# Patient Record
Sex: MALE | Race: WHITE | NOT HISPANIC OR LATINO
[De-identification: names, ages, dates, MRNs, and addresses within clinical notes are randomized per-mention and may not be internally consistent; named-entity substitution may affect disease eponyms.]

---

## 2021-02-09 PROBLEM — Z00.00 ENCOUNTER FOR PREVENTIVE HEALTH EXAMINATION: Status: ACTIVE | Noted: 2021-02-09

## 2021-02-10 ENCOUNTER — APPOINTMENT (OUTPATIENT)
Dept: PULMONOLOGY | Facility: CLINIC | Age: 61
End: 2021-02-10
Payer: COMMERCIAL

## 2021-02-10 ENCOUNTER — NON-APPOINTMENT (OUTPATIENT)
Age: 61
End: 2021-02-10

## 2021-02-10 DIAGNOSIS — J45.909 UNSPECIFIED ASTHMA, UNCOMPLICATED: ICD-10-CM

## 2021-02-10 DIAGNOSIS — Z87.891 PERSONAL HISTORY OF NICOTINE DEPENDENCE: ICD-10-CM

## 2021-02-10 PROCEDURE — 99072 ADDL SUPL MATRL&STAF TM PHE: CPT

## 2021-02-10 PROCEDURE — 99204 OFFICE O/P NEW MOD 45 MIN: CPT

## 2021-02-12 ENCOUNTER — APPOINTMENT (OUTPATIENT)
Dept: PULMONOLOGY | Facility: CLINIC | Age: 61
End: 2021-02-12
Payer: COMMERCIAL

## 2021-02-12 DIAGNOSIS — J98.8 COVID-19: ICD-10-CM

## 2021-02-12 DIAGNOSIS — U07.1 COVID-19: ICD-10-CM

## 2021-02-12 LAB
ALBUMIN SERPL ELPH-MCNC: 4 G/DL
ALP BLD-CCNC: 74 U/L
ALT SERPL-CCNC: 57 U/L
ANION GAP SERPL CALC-SCNC: 14 MMOL/L
AST SERPL-CCNC: 41 U/L
BASOPHILS # BLD AUTO: 0.05 K/UL
BASOPHILS NFR BLD AUTO: 0.5 %
BILIRUB SERPL-MCNC: 0.4 MG/DL
BUN SERPL-MCNC: 16 MG/DL
CALCIUM SERPL-MCNC: 9.4 MG/DL
CHLORIDE SERPL-SCNC: 102 MMOL/L
CO2 SERPL-SCNC: 25 MMOL/L
CREAT SERPL-MCNC: 0.96 MG/DL
CRP SERPL-MCNC: 1.51 MG/DL
DEPRECATED D DIMER PPP IA-ACNC: 1034 NG/ML DDU
EOSINOPHIL # BLD AUTO: 0.06 K/UL
EOSINOPHIL NFR BLD AUTO: 0.6 %
FERRITIN SERPL-MCNC: 860 NG/ML
GLUCOSE SERPL-MCNC: 105 MG/DL
HCT VFR BLD CALC: 46.1 %
HGB BLD-MCNC: 15.2 G/DL
IMM GRANULOCYTES NFR BLD AUTO: 1.6 %
LYMPHOCYTES # BLD AUTO: 1.88 K/UL
LYMPHOCYTES NFR BLD AUTO: 18.3 %
MAN DIFF?: NORMAL
MCHC RBC-ENTMCNC: 29.9 PG
MCHC RBC-ENTMCNC: 33 GM/DL
MCV RBC AUTO: 90.7 FL
MONOCYTES # BLD AUTO: 0.81 K/UL
MONOCYTES NFR BLD AUTO: 7.9 %
NEUTROPHILS # BLD AUTO: 7.32 K/UL
NEUTROPHILS NFR BLD AUTO: 71.1 %
PLATELET # BLD AUTO: 340 K/UL
POTASSIUM SERPL-SCNC: 4.7 MMOL/L
PROCALCITONIN SERPL-MCNC: 0.07 NG/ML
PROT SERPL-MCNC: 7.4 G/DL
RBC # BLD: 5.08 M/UL
RBC # FLD: 12.3 %
SODIUM SERPL-SCNC: 140 MMOL/L
WBC # FLD AUTO: 10.28 K/UL

## 2021-02-12 PROCEDURE — 99442: CPT

## 2021-02-12 NOTE — HISTORY OF PRESENT ILLNESS
[Home] : at home, [unfilled] , at the time of the visit. [Medical Office: ([facility])___] : at the medical office located in  [Spouse] : spouse [FreeTextEntry1] : 60-year-old male initially seen via TeleHealth 48 hours ago. Patient being followup for over 19. He states that he is feeling better with complete resolution of his fevers. He denies any increased shortness of breath or worsening cough or wheeze.\par \par Labs performed and demonstrated an elevated ferritin, d-dimer, and C-reactive protein.\par \par Impression: Covid 19 in recovery. Patient is in a hypercoagulable state. Prophylactic anticoagulation and antiplatelet therapy will be instituted

## 2021-02-12 NOTE — HISTORY OF PRESENT ILLNESS
[Home] : at home, [unfilled] , at the time of the visit. [Medical Office: ([facility])___] : at the medical office located in  [Spouse] : spouse [FreeTextEntry1] : 60-year-old male initially seen via TeleHealth 48 hours ago. Patient being followup for over 19. He states that he is feeling better with complete resolution of his fevers. He denies any increased shortness of breath or worsening cough or wheeze.\par \par Labs performed and demonstrated an elevated ferritin, d-dimer, and C-reactive protein.\par \par Impression: Covid 19 in recovery. Patient is in a hypercoagulable state. Prophylactic anticoagulation and antiplatelet therapy will be instituted

## 2021-02-13 LAB — SARS-COV-2 N GENE NPH QL NAA+PROBE: DETECTED

## 2021-02-13 NOTE — HISTORY OF PRESENT ILLNESS
[Home] : at home, [unfilled] , at the time of the visit. [Medical Office: ([facility])___] : at the medical office located in  [Verbal consent obtained from patient] : the patient, [unfilled] [TextBox_4] : Video failed and call completed by telephone\par \par 60-year-old male with a history of asthma, seen today via TeleHealth. Patient has not been maintained on medication. Due to lack of insurance until recently. On February 1, 2021. Patient developed headache with chest pain, and fever 203. He was seen on February 4, 2021 at [facility] where he was found to be Covid 19 positive, but not offered any therapy. After several days, and calling he was given a prescription for Augmentin. The patient's symptoms continued until 5 days ago when his fever abated. He continues to complain of wheezing and chest tightness with shortness of breath without any significant worsening. He denies any leg edema, paroxysmal nocturnal dyspnea, or orthopnea. There is no history of myalgias, arthralgias, or diarrhea

## 2021-02-13 NOTE — DISCUSSION/SUMMARY
[FreeTextEntry1] : 52-year-old male seen via a TeleHealth for presumed that Covid 19 infection. This appears to be already resolving with resolution of fevers and chills for 5 days. Unfortunately, patient does have a history of asthma with apparent reactive airways. Currently, he is on no medication. Will institute a prednisone taper for his asthma as well as a course of long-acting bronchodilator. Patient will be enrolled in the [facility] program with followup in 2 days. He has been told if the condition worsens, he should seek the attention of [facility] [place] emergency room

## 2021-03-08 ENCOUNTER — RX RENEWAL (OUTPATIENT)
Age: 61
End: 2021-03-08

## 2021-03-10 ENCOUNTER — APPOINTMENT (OUTPATIENT)
Dept: PULMONOLOGY | Facility: CLINIC | Age: 61
End: 2021-03-10

## 2021-10-12 ENCOUNTER — INPATIENT (INPATIENT)
Facility: HOSPITAL | Age: 61
LOS: 1 days | Discharge: HOME | DRG: 247 | End: 2021-10-14
Attending: HOSPITALIST | Admitting: FAMILY MEDICINE
Payer: COMMERCIAL

## 2021-10-12 VITALS — HEIGHT: 68 IN | WEIGHT: 225.09 LBS

## 2021-10-12 DIAGNOSIS — I24.9 ACUTE ISCHEMIC HEART DISEASE, UNSPECIFIED: ICD-10-CM

## 2021-10-12 LAB
ALBUMIN SERPL ELPH-MCNC: 4.3 G/DL — SIGNIFICANT CHANGE UP (ref 3.3–5.2)
ALP SERPL-CCNC: 67 U/L — SIGNIFICANT CHANGE UP (ref 40–120)
ALT FLD-CCNC: 31 U/L — SIGNIFICANT CHANGE UP
ANION GAP SERPL CALC-SCNC: 11 MMOL/L — SIGNIFICANT CHANGE UP (ref 5–17)
APTT BLD: 30.2 SEC — SIGNIFICANT CHANGE UP (ref 27.5–35.5)
AST SERPL-CCNC: 21 U/L — SIGNIFICANT CHANGE UP
BASOPHILS # BLD AUTO: 0.03 K/UL — SIGNIFICANT CHANGE UP (ref 0–0.2)
BASOPHILS NFR BLD AUTO: 0.6 % — SIGNIFICANT CHANGE UP (ref 0–2)
BILIRUB SERPL-MCNC: 0.3 MG/DL — LOW (ref 0.4–2)
BUN SERPL-MCNC: 15.5 MG/DL — SIGNIFICANT CHANGE UP (ref 8–20)
CALCIUM SERPL-MCNC: 9.2 MG/DL — SIGNIFICANT CHANGE UP (ref 8.6–10.2)
CHLORIDE SERPL-SCNC: 101 MMOL/L — SIGNIFICANT CHANGE UP (ref 98–107)
CO2 SERPL-SCNC: 24 MMOL/L — SIGNIFICANT CHANGE UP (ref 22–29)
CREAT SERPL-MCNC: 0.63 MG/DL — SIGNIFICANT CHANGE UP (ref 0.5–1.3)
D DIMER BLD IA.RAPID-MCNC: 236 NG/ML DDU — HIGH
EOSINOPHIL # BLD AUTO: 0.14 K/UL — SIGNIFICANT CHANGE UP (ref 0–0.5)
EOSINOPHIL NFR BLD AUTO: 2.7 % — SIGNIFICANT CHANGE UP (ref 0–6)
GLUCOSE SERPL-MCNC: 118 MG/DL — HIGH (ref 70–99)
HCT VFR BLD CALC: 42.3 % — SIGNIFICANT CHANGE UP (ref 39–50)
HGB BLD-MCNC: 14.7 G/DL — SIGNIFICANT CHANGE UP (ref 13–17)
IMM GRANULOCYTES NFR BLD AUTO: 0.6 % — SIGNIFICANT CHANGE UP (ref 0–1.5)
INR BLD: 1.15 RATIO — SIGNIFICANT CHANGE UP (ref 0.88–1.16)
LYMPHOCYTES # BLD AUTO: 1.54 K/UL — SIGNIFICANT CHANGE UP (ref 1–3.3)
LYMPHOCYTES # BLD AUTO: 29.3 % — SIGNIFICANT CHANGE UP (ref 13–44)
MCHC RBC-ENTMCNC: 31.6 PG — SIGNIFICANT CHANGE UP (ref 27–34)
MCHC RBC-ENTMCNC: 34.8 GM/DL — SIGNIFICANT CHANGE UP (ref 32–36)
MCV RBC AUTO: 91 FL — SIGNIFICANT CHANGE UP (ref 80–100)
MONOCYTES # BLD AUTO: 0.51 K/UL — SIGNIFICANT CHANGE UP (ref 0–0.9)
MONOCYTES NFR BLD AUTO: 9.7 % — SIGNIFICANT CHANGE UP (ref 2–14)
NEUTROPHILS # BLD AUTO: 3.01 K/UL — SIGNIFICANT CHANGE UP (ref 1.8–7.4)
NEUTROPHILS NFR BLD AUTO: 57.1 % — SIGNIFICANT CHANGE UP (ref 43–77)
NT-PROBNP SERPL-SCNC: 23 PG/ML — SIGNIFICANT CHANGE UP (ref 0–300)
PLATELET # BLD AUTO: 160 K/UL — SIGNIFICANT CHANGE UP (ref 150–400)
POTASSIUM SERPL-MCNC: 4 MMOL/L — SIGNIFICANT CHANGE UP (ref 3.5–5.3)
POTASSIUM SERPL-SCNC: 4 MMOL/L — SIGNIFICANT CHANGE UP (ref 3.5–5.3)
PROT SERPL-MCNC: 6.9 G/DL — SIGNIFICANT CHANGE UP (ref 6.6–8.7)
PROTHROM AB SERPL-ACNC: 13.2 SEC — SIGNIFICANT CHANGE UP (ref 10.6–13.6)
RBC # BLD: 4.65 M/UL — SIGNIFICANT CHANGE UP (ref 4.2–5.8)
RBC # FLD: 12.8 % — SIGNIFICANT CHANGE UP (ref 10.3–14.5)
SARS-COV-2 RNA SPEC QL NAA+PROBE: SIGNIFICANT CHANGE UP
SODIUM SERPL-SCNC: 136 MMOL/L — SIGNIFICANT CHANGE UP (ref 135–145)
TROPONIN T SERPL-MCNC: <0.01 NG/ML — SIGNIFICANT CHANGE UP (ref 0–0.06)
TROPONIN T SERPL-MCNC: <0.01 NG/ML — SIGNIFICANT CHANGE UP (ref 0–0.06)
WBC # BLD: 5.26 K/UL — SIGNIFICANT CHANGE UP (ref 3.8–10.5)
WBC # FLD AUTO: 5.26 K/UL — SIGNIFICANT CHANGE UP (ref 3.8–10.5)

## 2021-10-12 PROCEDURE — 93010 ELECTROCARDIOGRAM REPORT: CPT

## 2021-10-12 PROCEDURE — 99284 EMERGENCY DEPT VISIT MOD MDM: CPT

## 2021-10-12 PROCEDURE — 99222 1ST HOSP IP/OBS MODERATE 55: CPT

## 2021-10-12 PROCEDURE — 99285 EMERGENCY DEPT VISIT HI MDM: CPT

## 2021-10-12 RX ORDER — METOPROLOL TARTRATE 50 MG
25 TABLET ORAL
Refills: 0 | Status: DISCONTINUED | OUTPATIENT
Start: 2021-10-13 | End: 2021-10-14

## 2021-10-12 RX ORDER — SODIUM CHLORIDE 9 MG/ML
500 INJECTION INTRAMUSCULAR; INTRAVENOUS; SUBCUTANEOUS ONCE
Refills: 0 | Status: COMPLETED | OUTPATIENT
Start: 2021-10-12 | End: 2021-10-12

## 2021-10-12 RX ORDER — ATORVASTATIN CALCIUM 80 MG/1
80 TABLET, FILM COATED ORAL AT BEDTIME
Refills: 0 | Status: DISCONTINUED | OUTPATIENT
Start: 2021-10-12 | End: 2021-10-14

## 2021-10-12 RX ORDER — ASPIRIN/CALCIUM CARB/MAGNESIUM 324 MG
162 TABLET ORAL ONCE
Refills: 0 | Status: COMPLETED | OUTPATIENT
Start: 2021-10-12 | End: 2021-10-12

## 2021-10-12 RX ORDER — METOPROLOL TARTRATE 50 MG
50 TABLET ORAL ONCE
Refills: 0 | Status: COMPLETED | OUTPATIENT
Start: 2021-10-12 | End: 2021-10-12

## 2021-10-12 RX ORDER — NITROGLYCERIN 6.5 MG
0.4 CAPSULE, EXTENDED RELEASE ORAL
Refills: 0 | Status: DISCONTINUED | OUTPATIENT
Start: 2021-10-12 | End: 2021-10-14

## 2021-10-12 RX ADMIN — SODIUM CHLORIDE 500 MILLILITER(S): 9 INJECTION INTRAMUSCULAR; INTRAVENOUS; SUBCUTANEOUS at 14:46

## 2021-10-12 RX ADMIN — Medication 162 MILLIGRAM(S): at 10:13

## 2021-10-12 RX ADMIN — ATORVASTATIN CALCIUM 80 MILLIGRAM(S): 80 TABLET, FILM COATED ORAL at 22:17

## 2021-10-12 RX ADMIN — SODIUM CHLORIDE 500 MILLILITER(S): 9 INJECTION INTRAMUSCULAR; INTRAVENOUS; SUBCUTANEOUS at 16:00

## 2021-10-12 RX ADMIN — Medication 50 MILLIGRAM(S): at 14:45

## 2021-10-12 NOTE — ED PROVIDER NOTE - ATTENDING CONTRIBUTION TO CARE
60yo male with pmh of asthma presents with chest pain radiating to the left arm and neck. Pt with no acute ischemic changes on ekg, trop neg, cards consulted ordered CTCA, which was +, will admit for further management

## 2021-10-12 NOTE — ED PROVIDER NOTE - CLINICAL SUMMARY MEDICAL DECISION MAKING FREE TEXT BOX
62yo M presenting with left sided chest pain. EKG NSR, non-ischemic. will further evaluate with labs, cxr

## 2021-10-12 NOTE — H&P ADULT - HISTORY OF PRESENT ILLNESS
60 y/o male with h/o asthma ? not on any meds,  angina , as per pt. had cardiac cath in 2008 and it was negative but was started on metoprolol later he stopped using metoprolol on his own ( 4 years ago)  has been using asa 81 mg daily for past 2 weeks as he had the feeling something was not right came to [facility]ED for chest pain. Pt states that he has been experiencing on and off left sided throbbing chest pain radiating to left axilla with associated pulsating sensation in left side of neck over the past month. 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 Pt denies SOB, palpitations, fevers, chills coughing, n/v/d  or abd. pain. Today, chest pain lasted "for hours". In ED, Tropx2-negative, Xray-normal pulmonary vasculature with no consolidation or effusion.  60 y/o male with h/o asthma ? not on any meds,  angina ? as per pt. had cardiac cath in 2008 and it was negative but was started on metoprolol later he stopped using metoprolol on his own ( 4 years ago)  has been using asa 81 mg daily for past 2 weeks as he had the feeling something was not right came to [facility]-ED for chest pain. Pt states that he has been experiencing on and off left sided throbbing chest pain radiating to left axilla with associated pulsating sensation in left side of neck over the past month. 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 Pt denies SOB, palpitations, fevers, chills coughing, n/v/d  or abd. pain. Today, chest pain lasted "for hours". In ED, Tropx2-negative, Xray chest no acute findings, had ct chest cardiac and had very high calcium score. Cardiology team plans to do cardiac cath in am.

## 2021-10-12 NOTE — CONSULT NOTE ADULT - ASSESSMENT
Pt is a 60 y/o male asthma, angina (previously on Metoprolol, stopped on his own), who presents to [facility]-ED for chest pain. Pt states that he has been experiencing left sided throbbing chest pain radiating to left axilla with associated pulsating sensation in left side of neck over the past month. The first time patient experienced chest [place], pain relieved with rest, usually lasting 20 minutes in duration. Pt denies SOB, palpitations, fevers, chills coughing. Patient states he has had an angiogram in 2008 which was normal but chest pain that prompted angiogram felt different. Today, chest pain lasted "for hours". In ED, Tropx2-negative, Xray-normal pulmonary vasculature with no consolidation or effusion.       Chest Pain  -Tropx2-negative  -Xray-normal pulmonary vasculature with no consolidation or effusion  -ECG-NSR HR @ 61, non-specific T wave abnormalties  -Plan for CCTA today  -Give one dose Lopressor 50mg prior to CCTA

## 2021-10-12 NOTE — ED PROVIDER NOTE - PHYSICAL EXAMINATION
Gen: No acute distress, non toxic  HEENT: NCAT, Mucous membranes moist, pink conjunctivae, EOMI  CV: RRR, nl s1/s2.  Resp: CTAB, normal rate and effort  GI: Abdomen soft, NT, ND. No rebound, no guarding  : No CVAT  Neuro: A&O x 3, moving all 4 extremities  MSK: No spine or joint tenderness to palpation  Skin: No rashes. intact and perfused.

## 2021-10-12 NOTE — CONSULT NOTE ADULT - SUBJECTIVE AND OBJECTIVE BOX
[place] [facility]                                                               Office:  [address] [address]                                                              Telephone: [phone]. Fax:[phone]                                                                        CARDIOLOGY CONSULTATION NOTE                                                                                             Consult requested by:    Reason for Consultation:   PMD:  Primary Cardiology:  History obtained by: Patient and medical record   obtained: No    Chief complaint:    Patient is a 61y old  Male who presents with a chief complaint of       HPI:        REVIEW OF SYMPTOMS:     CONSTITUTIONAL: No fever, weight loss, or fatigue  ENMT:  No difficulty hearing, tinnitus, vertigo; No sinus or throat pain  NECK: No pain or stiffness  CARDIOVASCULAR: No chest pain, dyspnea, syncope, palpitations, dizziness, Orthopnea, Paroxsymal nocturnal dyspnea  RESPIRATORY: No Dyspnea on exertion, Shortness of breath, cough, wheezing  : No dysuria, no hematuria   GI: No dark color stool, no melena, no diarrhea, no constipation, no abdominal pain   NEURO: No headache, no dizziness, no slurred speech   MUSCULOSKELETAL: No joint pain or swelling; No muscle, back, or extremity pain  PSYCH: No agitation, no anxiety.    ALL OTHER REVIEW OF SYSTEMS ARE NEGATIVE.      PREVIOUS DIAGNOSTIC TESTING  ECHO FINDINGS:      STRESS FINDINGS:      CATHETERIZATION FINDINGS:         ALLERGIES: Allergies    No Known Allergies    Intolerances          PAST MEDICAL HISTORY  Asthma        PAST SURGICAL HISTORY      FAMILY HISTORY:      SOCIAL HISTORY:  Denies smoking/alcohol/drugs  CIGARETTES:     ALCOHOL:  DRUGS:      CURRENT MEDICATIONS:           HOME MEDICATIONS:      Vital Signs Last 24 Hrs  T(C): 36.9 (12 Oct 2021 09:22), Max: 36.9 (12 Oct 2021 09:22)  T(F): 98.4 (12 Oct 2021 09:22), Max: 98.4 (12 Oct 2021 09:22)  HR: 71 (12 Oct 2021 09:22) (71 - 71)  BP: 154/71 (12 Oct 2021 09:22) (154/71 - 154/71)  RR: 18 (12 Oct 2021 09:22) (18 - 18)  SpO2: 97% (12 Oct 2021 09:22) (97% - 97%)      PHYSICAL EXAM:  Constitutional: Comfortable . No acute distress.   HEENT: Atraumatic and normocephalic , neck is supple . no JVD. No carotid bruit. PEERL   CNS: A&Ox3. No focal deficits. EOMI.   Lymph Nodes: Cervical : Not palpable.  Respiratory: CTAB  Cardiovascular: S1S2 RRR. No murmur/rubs or gallop.  Gastrointestinal: Soft non-tender and non distended . +Bowel sounds. negative Lee's sign.  Extremities: No edema.   Psychiatric: Calm . no agitation.  Skin: No skin rash/ulcers visualized to face, hands or feet.    Intake and output:     LABS:                        14.7   5.26  )-----------( 160      ( 12 Oct 2021 10:13 )             42.3     10-12    136  |  101  |  15.5  ----------------------------<  118<H>  4.0   |  24.0  |  0.63    Ca    9.2      12 Oct 2021 10:13    TPro  6.9  /  Alb  4.3  /  TBili  0.3<L>  /  DBili  x   /  AST  21  /  ALT  31  /  AlkPhos  67  10-12    CARDIAC MARKERS ( 12 Oct 2021 13:31 )  x     / <0.01 ng/mL / x     / x     / x      CARDIAC MARKERS ( 12 Oct 2021 10:13 )  x     / <0.01 ng/mL / x     / x     / x        ;p-BNP=Serum Pro-Brain Natriuretic Peptide: 23 pg/mL (10-12 @ 10:13)      INTERPRETATION OF TELEMETRY: NSR HR @ 50-60s  ECG: NSR HR @ 61, non-specific T wave abnormalties    RADIOLOGY & ADDITIONAL STUDIES:      X-ray:  < from: Xray Chest 1 View- PORTABLE-Urgent (10.12.21 @ 11:01) >  IMPRESSION:  Expiratory view, normal pulmonary vasculature with no consolidation or effusion                                                                         [place] [facility]                                                               Office:  [address] [address]                                                              Telephone: [phone]. Fax:[phone]                                                                        CARDIOLOGY CONSULTATION NOTE                                                                                             Consult requested by:  LINK Shrestha  Reason for Consultation: Chest Pain  PMD:  Primary Cardiology:  History obtained by: Patient and medical record   obtained: No    Chief complaint:    Patient is a 61y old  Male who presents with a chief complaint of       HPI:        REVIEW OF SYMPTOMS:     CONSTITUTIONAL: No fever, weight loss, or fatigue  ENMT:  No difficulty hearing, tinnitus, vertigo; No sinus or throat pain  NECK: No pain or stiffness  CARDIOVASCULAR: No chest pain, dyspnea, syncope, palpitations, dizziness, Orthopnea, Paroxsymal nocturnal dyspnea  RESPIRATORY: No Dyspnea on exertion, Shortness of breath, cough, wheezing  : No dysuria, no hematuria   GI: No dark color stool, no melena, no diarrhea, no constipation, no abdominal pain   NEURO: No headache, no dizziness, no slurred speech   MUSCULOSKELETAL: No joint pain or swelling; No muscle, back, or extremity pain  PSYCH: No agitation, no anxiety.    ALL OTHER REVIEW OF SYSTEMS ARE NEGATIVE.      ALLERGIES: Allergies    No Known Allergies    Intolerances          PAST MEDICAL HISTORY  Asthma        PAST SURGICAL HISTORY      FAMILY HISTORY:      SOCIAL HISTORY:  Denies smoking/alcohol/drugs  CIGARETTES:     ALCOHOL:  DRUGS:      CURRENT MEDICATIONS:           HOME MEDICATIONS:      Vital Signs Last 24 Hrs  T(C): 36.9 (12 Oct 2021 09:22), Max: 36.9 (12 Oct 2021 09:22)  T(F): 98.4 (12 Oct 2021 09:22), Max: 98.4 (12 Oct 2021 09:22)  HR: 71 (12 Oct 2021 09:22) (71 - 71)  BP: 154/71 (12 Oct 2021 09:22) (154/71 - 154/71)  RR: 18 (12 Oct 2021 09:22) (18 - 18)  SpO2: 97% (12 Oct 2021 09:22) (97% - 97%)      PHYSICAL EXAM:  Constitutional: Comfortable . No acute distress.   HEENT: Atraumatic and normocephalic , neck is supple . no JVD. No carotid bruit. PEERL   CNS: A&Ox3. No focal deficits. EOMI.   Lymph Nodes: Cervical : Not palpable.  Respiratory: CTAB  Cardiovascular: S1S2 RRR. No murmur/rubs or gallop.  Gastrointestinal: Soft non-tender and non distended . +Bowel sounds. negative Lee's sign.  Extremities: No edema.   Psychiatric: Calm . no agitation.  Skin: No skin rash/ulcers visualized to face, hands or feet.    Intake and output:     LABS:                        14.7   5.26  )-----------( 160      ( 12 Oct 2021 10:13 )             42.3     10-12    136  |  101  |  15.5  ----------------------------<  118<H>  4.0   |  24.0  |  0.63    Ca    9.2      12 Oct 2021 10:13    TPro  6.9  /  Alb  4.3  /  TBili  0.3<L>  /  DBili  x   /  AST  21  /  ALT  31  /  AlkPhos  67  10-12    CARDIAC MARKERS ( 12 Oct 2021 13:31 )  x     / <0.01 ng/mL / x     / x     / x      CARDIAC MARKERS ( 12 Oct 2021 10:13 )  x     / <0.01 ng/mL / x     / x     / x        ;p-BNP=Serum Pro-Brain Natriuretic Peptide: 23 pg/mL (10-12 @ 10:13)      INTERPRETATION OF TELEMETRY: NSR HR @ 50-60s  ECG: NSR HR @ 61, non-specific T wave abnormalties    RADIOLOGY & ADDITIONAL STUDIES:      X-ray:  < from: Xray Chest 1 View- PORTABLE-Urgent (10.12.21 @ 11:01) >  IMPRESSION:  Expiratory view, normal pulmonary vasculature with no consolidation or effusion                                                                         [place] [facility]                                                               Office:  [address] [address]                                                              Telephone: [phone]. Fax:[phone]                                                                        CARDIOLOGY CONSULTATION NOTE                                                                                             Consult requested by:  LINK Shrestha  Reason for Consultation: Chest Pain  PMD: Unknown  Primary Cardiology: None  History obtained by: Patient and medical record   obtained: No    Chief complaint:    Patient is a 61y old  Male who presents with a chief complaint of chest pain      HPI: Pt is a 60 y/o male asthma, angina (previously on Metoprolol, stopped on his own), who presents to [facility]-ED for chest pain. Pt states that he has been experiencing left sided throbbing chest pain radiating to left axilla with associated pulsating sensation in left side of neck over the past month. The first time patient experienced chest pain [place], pain relieved with rest, usually lasting 20 minutes in duration. Pt denies SOB, palpitations, fevers, chills coughing. Patient states he has had an angiogram in 2008 which was normal but chest pain that prompted angiogram felt different. Today, chest pain lasted "for hours". In ED, Tropx2-negative, Xray-normal pulmonary vasculature with no consolidation or effusion.         REVIEW OF SYMPTOMS:     CONSTITUTIONAL: No fever, weight loss, or fatigue  ENMT:  No difficulty hearing, tinnitus, vertigo; No sinus or throat pain  NECK: No pain or stiffness  CARDIOVASCULAR: See HPI  RESPIRATORY: No Dyspnea on exertion, Shortness of breath, cough, wheezing  : No dysuria, no hematuria   GI: No dark color stool, no melena, no diarrhea, no constipation, no abdominal pain   NEURO: No headache, no dizziness, no slurred speech   MUSCULOSKELETAL: No joint pain or swelling; No muscle, back, or extremity pain  PSYCH: No agitation, no anxiety.    ALL OTHER REVIEW OF SYSTEMS ARE NEGATIVE.      ALLERGIES: Allergies    No Known Allergies    Intolerances          PAST MEDICAL HISTORY  Asthma        PAST SURGICAL HISTORY      FAMILY HISTORY:  Father-MI age 40s, CABG  Mother-stroke, valve replacement  Brother-MI age 30S  Sister-age 77, CABG     SOCIAL HISTORY:    CIGARETTES: +cigar over the weekends  ALCOHOL: Occasional ETOH  DRUGS: Denies    HOME MEDICATIONS:   None    Vital Signs Last 24 Hrs  T(C): 36.9 (12 Oct 2021 09:22), Max: 36.9 (12 Oct 2021 09:22)  T(F): 98.4 (12 Oct 2021 09:22), Max: 98.4 (12 Oct 2021 09:22)  HR: 71 (12 Oct 2021 09:22) (71 - 71)  BP: 154/71 (12 Oct 2021 09:22) (154/71 - 154/71)  RR: 18 (12 Oct 2021 09:22) (18 - 18)  SpO2: 97% (12 Oct 2021 09:22) (97% - 97%)      PHYSICAL EXAM:  Constitutional: Comfortable . No acute distress.   HEENT: Atraumatic and normocephalic , neck is supple . no JVD. No carotid bruit. PEERL   CNS: A&Ox3. No focal deficits. EOMI.   Lymph Nodes: Cervical : Not palpable.  Respiratory: CTAB  Cardiovascular: S1S2 RRR. No murmur/rubs or gallop.  Gastrointestinal: Soft non-tender and non distended . +Bowel sounds. negative Lee's sign.  Extremities: No edema.   Psychiatric: Calm . no agitation.  Skin: No skin rash/ulcers visualized to face, hands or feet.    Intake and output:     LABS:                        14.7   5.26  )-----------( 160      ( 12 Oct 2021 10:13 )             42.3     10-12    136  |  101  |  15.5  ----------------------------<  118<H>  4.0   |  24.0  |  0.63    Ca    9.2      12 Oct 2021 10:13    TPro  6.9  /  Alb  4.3  /  TBili  0.3<L>  /  DBili  x   /  AST  21  /  ALT  31  /  AlkPhos  67  10-12    CARDIAC MARKERS ( 12 Oct 2021 13:31 )  x     / <0.01 ng/mL / x     / x     / x      CARDIAC MARKERS ( 12 Oct 2021 10:13 )  x     / <0.01 ng/mL / x     / x     / x        ;p-BNP=Serum Pro-Brain Natriuretic Peptide: 23 pg/mL (10-12 @ 10:13)      INTERPRETATION OF TELEMETRY: NSR HR @ 50-60s  ECG: NSR HR @ 61, non-specific T wave abnormalties    RADIOLOGY & ADDITIONAL STUDIES:      X-ray:  < from: Xray Chest 1 View- PORTABLE-Urgent (10.12.21 @ 11:01) >  IMPRESSION:  Expiratory view, normal pulmonary vasculature with no consolidation or effusion                                                                         [place] [facility]                                                               Office:  [address] [address]                                                              Telephone: [phone]. Fax:[phone]                                                                        CARDIOLOGY CONSULTATION NOTE                                                                                             Consult requested by:  LINK Shrestha  Reason for Consultation: Chest Pain  PMD: Unknown  Primary Cardiology: None  History obtained by: Patient and medical record   obtained: No    Chief complaint:    Patient is a 61y old  Male who presents with a chief complaint of chest pain.    HPI: Pt is a 62 y/o male asthma, angina (previously on Metoprolol, stopped on his own), who presents to [facility]-ED for chest pain. Pt states that he has been experiencing left sided throbbing chest pain radiating to left axilla with associated pulsating sensation in left side of neck over the past month. The first time patient experienced chest pain [place], pain relieved with rest, usually lasting 20 minutes in duration. Pt denies SOB, palpitations, fevers, chills coughing. Patient states he has had an angiogram in 2008 which was normal but chest pain that prompted angiogram felt different. Today, chest pain lasted "for hours". In ED, Tropx2-negative, Xray-normal pulmonary vasculature with no consolidation or effusion.       REVIEW OF SYMPTOMS:   CONSTITUTIONAL: No fever, weight loss, or fatigue  ENMT:  No difficulty hearing, tinnitus, vertigo; No sinus or throat pain  NECK: No pain or stiffness  CARDIOVASCULAR: See HPI  RESPIRATORY: No Dyspnea on exertion, Shortness of breath, cough, wheezing  : No dysuria, no hematuria   GI: No dark color stool, no melena, no diarrhea, no constipation, no abdominal pain   NEURO: No headache, no dizziness, no slurred speech   MUSCULOSKELETAL: No joint pain or swelling; No muscle, back, or extremity pain  PSYCH: No agitation, no anxiety.    ALL OTHER REVIEW OF SYSTEMS ARE NEGATIVE.    ALLERGIES: Allergies  No Known Allergies    PAST MEDICAL HISTORY  Asthma    FAMILY HISTORY:  Father-MI age 40s, CABG  Mother-stroke, valve replacement  Brother-MI age 30S  Sister-age 77, CABG     SOCIAL HISTORY:    CIGARETTES: +cigar over the weekends  ALCOHOL: Occasional ETOH  DRUGS: Denies    HOME MEDICATIONS:   None    Vital Signs Last 24 Hrs  T(C): 36.9 (12 Oct 2021 09:22), Max: 36.9 (12 Oct 2021 09:22)  T(F): 98.4 (12 Oct 2021 09:22), Max: 98.4 (12 Oct 2021 09:22)  HR: 71 (12 Oct 2021 09:22) (71 - 71)  BP: 154/71 (12 Oct 2021 09:22) (154/71 - 154/71)  RR: 18 (12 Oct 2021 09:22) (18 - 18)  SpO2: 97% (12 Oct 2021 09:22) (97% - 97%)    PHYSICAL EXAM:  Constitutional: Comfortable . No acute distress.   HEENT: Atraumatic and normocephalic , neck is supple . no JVD. No carotid bruit. PEERL   CNS: A&Ox3. No focal deficits. EOMI.   Lymph Nodes: Cervical : Not palpable.  Respiratory: CTAB  Cardiovascular: S1S2 RRR. No murmur/rubs or gallop.  Gastrointestinal: Soft non-tender and non distended . +Bowel sounds. negative Lee's sign.  Extremities: No edema.   Psychiatric: Calm . no agitation.  Skin: No skin rash/ulcers visualized to face, hands or feet.    LABS:                        14.7   5.26  )-----------( 160      ( 12 Oct 2021 10:13 )             42.3     10-12    136  |  101  |  15.5  ----------------------------<  118<H>  4.0   |  24.0  |  0.63    Ca    9.2      12 Oct 2021 10:13    TPro  6.9  /  Alb  4.3  /  TBili  0.3<L>  /  DBili  x   /  AST  21  /  ALT  31  /  AlkPhos  67  10-12    CARDIAC MARKERS ( 12 Oct 2021 13:31 )  x     / <0.01 ng/mL / x     / x     / x      CARDIAC MARKERS ( 12 Oct 2021 10:13 )  x     / <0.01 ng/mL / x     / x     / x        ;p-BNP=Serum Pro-Brain Natriuretic Peptide: 23 pg/mL (10-12 @ 10:13)      INTERPRETATION OF TELEMETRY: NSR HR @ 50-60s  ECG: NSR HR @ 61, non-specific T wave abnormalties    RADIOLOGY & ADDITIONAL STUDIES:      X-ray:  < from: Xray Chest 1 View- PORTABLE-Urgent (10.12.21 @ 11:01) >  IMPRESSION:  Expiratory view, normal pulmonary vasculature with no consolidation or effusion

## 2021-10-12 NOTE — H&P ADULT - NSHPPHYSICALEXAM_GEN_ALL_CORE
Vital Signs Last 24 Hrs  T(C): 36.6 (12 Oct 2021 16:37), Max: 36.9 (12 Oct 2021 09:22)  T(F): 97.8 (12 Oct 2021 16:37), Max: 98.4 (12 Oct 2021 09:22)  HR: 55 (12 Oct 2021 16:37) (55 - 71)  BP: 121/79 (12 Oct 2021 16:37) (121/79 - 154/71)  BP(mean): --  RR: 19 (12 Oct 2021 16:37) (18 - 19)  SpO2: 98% (12 Oct 2021 16:37) (97% - 98%)    General: Well developed. well nourished. not in distress  HEENT: AT, NC. PERRL. intact EOM. no throat erythema or exudate.   Neck: supple. no JVD.   Chest: CTA bilaterally, no w /r/r.   Heart: S1,S2. RRR. no heart murmur. no edema.  Abdomen: soft. non-tender. non-distended. + BS.   Ext: no calf tenderness. ROM of all ext. intact. distal pulses 2 +.  Neuro: AAO x3. no focal weakness. no speech disorder, cn ii to xii intact. m /r/s intact.  Skin: no rash noted, normal tone. no pallor.   psych : mood ok, no si/hi. co-operative.

## 2021-10-12 NOTE — ED ADULT NURSE NOTE - OBJECTIVE STATEMENT
A&Ox3, rresp wnl, c/o left chest pain onset 7am, was getting ready for work, pain to left chest also felt heartbeat in neck, feel better that I am in the ER

## 2021-10-12 NOTE — ED PROVIDER NOTE - PROGRESS NOTE DETAILS
d-dimer 236, negative - Age-adjusted d-dimer cutoff 295 trop neg x 2. pt evaluated by cardiology team, recommending CTCA.

## 2021-10-12 NOTE — CONSULT NOTE ADULT - ATTENDING COMMENTS
Above noted.  pt was seen and examined  He has a strong family history of premature CAD.  CP is atypical but has gotten worst in the past couple of weeks. not related to activity.   Denies any SOB. Referred for cardiac CT but calcium score is too high.  Recycle cardiac enzymes. BB and statin  Plan to have cardiac cath in am.   I agree with a.p.

## 2021-10-12 NOTE — H&P ADULT - NSICDXFAMILYHX_GEN_ALL_CORE_FT
FAMILY HISTORY:  Father  Still living? Unknown  FH: heart disease, Age at diagnosis: Age Unknown    Mother  Still living? Unknown  FH: heart disease, Age at diagnosis: Age Unknown

## 2021-10-12 NOTE — ED PROVIDER NOTE - OBJECTIVE STATEMENT
60yo M pmhx asthma, angina presents to ED c/o chest pain. Pt noting left sided chest pain onset 7am while driving. States he has hx of angina, has had intermittent anginal chest pain over the past few weeks but this was much worse. 60yo M pmhx asthma, angina presents to ED c/o chest pain. Pt noting left sided chest pain onset 7am while driving. States he has hx of angina, has had intermittent anginal chest pain over the past few weeks but this was much worse. Noting left sided chest pain and left axilla pain, could feel "pounding" pulse to left side neck. Pain improved after 2 hours but pt still noting dull ache to left chest. Used to take metoprolol for his angina but self dc'ed medication 4 years ago. Does not follow with cardiologist or PMD currently. Family hx CAD, MI in father age 40, MI in brother age 30s. Denies fever, chills, sob, diaphoresis, n/v/d, abdominal pain, back pain.

## 2021-10-12 NOTE — ED PROVIDER NOTE - NS ED ROS FT
Gen: denies fever, chills, fatigue, weight loss  Skin: denies rashes, laceration, bruising  HEENT: denies visual changes, ear pain, nasal congestion, throat pain  Respiratory: denies RAMSAY, SOB, cough, wheezing  Cardiovascular: +CP. Denies palpitations, diaphoresis, LE edema  GI: denies abdominal pain, n/v/d  : denies dysuria, frequency, urgency, bowel/bladder incontinence  MSK: denies joint swelling/pain, back pain, neck pain  Neuro: denies headache, dizziness, weakness, numbness  Psych: denies anxiety, depression, SI/HI, visual/auditory hallucinations

## 2021-10-13 ENCOUNTER — TRANSCRIPTION ENCOUNTER (OUTPATIENT)
Age: 61
End: 2021-10-13

## 2021-10-13 LAB
A1C WITH ESTIMATED AVERAGE GLUCOSE RESULT: 6.3 % — HIGH (ref 4–5.6)
CHOLEST SERPL-MCNC: 175 MG/DL — SIGNIFICANT CHANGE UP
CK SERPL-CCNC: 99 U/L — SIGNIFICANT CHANGE UP (ref 30–200)
COVID-19 SPIKE DOMAIN AB INTERP: POSITIVE
COVID-19 SPIKE DOMAIN ANTIBODY RESULT: >250 U/ML — HIGH
ESTIMATED AVERAGE GLUCOSE: 134 MG/DL — HIGH (ref 68–114)
HCV AB S/CO SERPL IA: 0.08 S/CO — SIGNIFICANT CHANGE UP (ref 0–0.99)
HCV AB SERPL-IMP: SIGNIFICANT CHANGE UP
HDLC SERPL-MCNC: 44 MG/DL — SIGNIFICANT CHANGE UP
LIPID PNL WITH DIRECT LDL SERPL: 81 MG/DL — SIGNIFICANT CHANGE UP
NON HDL CHOLESTEROL: 131 MG/DL — HIGH
SARS-COV-2 IGG+IGM SERPL QL IA: >250 U/ML — HIGH
SARS-COV-2 IGG+IGM SERPL QL IA: POSITIVE
TRIGL SERPL-MCNC: 249 MG/DL — HIGH
TROPONIN T SERPL-MCNC: <0.01 NG/ML — SIGNIFICANT CHANGE UP (ref 0–0.06)

## 2021-10-13 PROCEDURE — 99232 SBSQ HOSP IP/OBS MODERATE 35: CPT | Mod: 25

## 2021-10-13 PROCEDURE — 93010 ELECTROCARDIOGRAM REPORT: CPT

## 2021-10-13 PROCEDURE — 99152 MOD SED SAME PHYS/QHP 5/>YRS: CPT

## 2021-10-13 PROCEDURE — 92978 ENDOLUMINL IVUS OCT C 1ST: CPT | Mod: 26,LD

## 2021-10-13 PROCEDURE — 93458 L HRT ARTERY/VENTRICLE ANGIO: CPT | Mod: 26,59

## 2021-10-13 PROCEDURE — 99223 1ST HOSP IP/OBS HIGH 75: CPT | Mod: 25

## 2021-10-13 PROCEDURE — 92928 PRQ TCAT PLMT NTRAC ST 1 LES: CPT | Mod: LD

## 2021-10-13 PROCEDURE — 99233 SBSQ HOSP IP/OBS HIGH 50: CPT

## 2021-10-13 RX ORDER — ASPIRIN/CALCIUM CARB/MAGNESIUM 324 MG
81 TABLET ORAL DAILY
Refills: 0 | Status: DISCONTINUED | OUTPATIENT
Start: 2021-10-13 | End: 2021-10-14

## 2021-10-13 RX ORDER — CLOPIDOGREL BISULFATE 75 MG/1
600 TABLET, FILM COATED ORAL ONCE
Refills: 0 | Status: COMPLETED | OUTPATIENT
Start: 2021-10-13 | End: 2021-10-13

## 2021-10-13 RX ORDER — CLOPIDOGREL BISULFATE 75 MG/1
75 TABLET, FILM COATED ORAL DAILY
Refills: 0 | Status: DISCONTINUED | OUTPATIENT
Start: 2021-10-14 | End: 2021-10-14

## 2021-10-13 RX ORDER — ASPIRIN/CALCIUM CARB/MAGNESIUM 324 MG
81 TABLET ORAL ONCE
Refills: 0 | Status: COMPLETED | OUTPATIENT
Start: 2021-10-13 | End: 2021-10-13

## 2021-10-13 RX ADMIN — CLOPIDOGREL BISULFATE 600 MILLIGRAM(S): 75 TABLET, FILM COATED ORAL at 17:43

## 2021-10-13 RX ADMIN — Medication 25 MILLIGRAM(S): at 17:40

## 2021-10-13 RX ADMIN — Medication 81 MILLIGRAM(S): at 08:13

## 2021-10-13 RX ADMIN — ATORVASTATIN CALCIUM 80 MILLIGRAM(S): 80 TABLET, FILM COATED ORAL at 21:18

## 2021-10-13 RX ADMIN — Medication 25 MILLIGRAM(S): at 07:45

## 2021-10-13 NOTE — PROGRESS NOTE ADULT - SUBJECTIVE AND OBJECTIVE BOX
Department of Cardiology                                                                  [facility]/[place] [address]                                                            Telephone: [phone]. Fax:[phone]                                                                                         PCI NOTE       Subjective:  61y  Male who had a left heart catheterization which showed (official report pending):       LM: No significant CAD.       LAD: 90% proximal heavily calcified stenosis treated with Shockwave intravascular lithotripsy and a 3.5 X 24 Synergy FRANTZ, and a 70% mid heavily calcified stenosis treated with Shockwave intravascular lithotripsy and a 3.0 X 48 Synergy FRANTZ.       LCX: 10-20% proximal stenosis.       RCA: 10-20% diffuse stenosis.         Access: Right radial artery       Hemostasis: Radial band       Total Contrast: 185 mL Omnipaque    PAST MEDICAL & SURGICAL HISTORY:  Asthma  No significant past surgical history    FAMILY HISTORY:  FH: heart disease (Father, Mother)    Home Medications: None    Patient is a 61y old  Male who presents with a chief complaint of chest pain (12 Oct 2021 18:27)    HPI: 60 y/o male with h/o asthma ? not on any meds,  angina ? as per pt. had cardiac cath in  and it was negative but was started on metoprolol later he stopped using metoprolol on his own ( 4 years ago)  has been using asa 81 mg daily for past 2 weeks as he had the feeling something was not right came to [facility]ED for chest pain. Pt states that he has been experiencing on and off left sided throbbing chest pain radiating to left axilla with associated pulsating sensation in left side of neck over the past month. 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 Pt denies SOB, palpitations, fevers, chills coughing, n/v/d  or abd. pain. Today, chest pain lasted "for hours". In ED, Tropx2-negative, Xray chest no acute findings, had ct chest cardiac and had very high calcium score. Cardiology team plans to do cardiac cath in am.  (12 Oct 2021 18:27)    General: No fatigue, no fevers/chills  Respiratory: No dyspnea, no cough, no wheeze  CV: No chest pain, no palpitations  Abd: No nausea  Neuro: No headache, no dizziness    No Known Allergies    Objective:  Vital Signs Last 24 Hrs  T(C): 36.4 (13 Oct 2021 06:08), Max: 36.6 (12 Oct 2021 16:37)  T(F): 97.6 (13 Oct 2021 06:08), Max: 97.8 (12 Oct 2021 16:37)  HR: 62 (13 Oct 2021 06:08) (55 - 64)  BP: 110/67 (13 Oct 2021 06:08) (110/67 - 146/83)  RR: 18 (13 Oct 2021 06:08) (16 - 20)  SpO2: 97% (13 Oct 2021 06:08) (97% - 98%)    CM: SR  Neuro: A&OX3, CN 2-12 intact  HEENT: NC, AT  Lungs: CTA B/L  CV: S1, S2, no murmur, RRR  Abd: Soft  Extremity: Right radial band: no bleeding, fingers warm with good cap refil    EK.7   5.26  )-----------( 160      ( 12 Oct 2021 10:13 )             42.3     10-12    136  |  101  |  15.5  ----------------------------<  118  4.0   |  24.0  |  0.63    Ca    9.2      12 Oct 2021 10:13    TPro  6.9  /  Alb  4.3  /  TBili  0.3  /  DBili  x   /  AST  21  /  ALT  31  /  AlkPhos  67  10-12    PT/INR - ( 12 Oct 2021 19:53 )   PT: 13.2 sec;   INR: 1.15 ratio    PTT - ( 12 Oct 2021 19:53 )  PTT:30.2 sec    Lipids       Total Cholesterol: 175       Triglycerides: 249       HDL: 44       Non-HDL: 131       LDL: 81    HgbA1C: 6.3%                                                                          Department of Cardiology                                                                  [facility]/[place] [address]                                                            Telephone: [phone]. Fax:[phone]                                                                                         PCI NOTE       Subjective:  61y  Male who had a left heart catheterization which showed (official report pending):       LM: No significant CAD.       LAD: 90% proximal heavily calcified stenosis treated with Shockwave intravascular lithotripsy and a 3.5 X 24 Synergy FRANTZ, and a 70% mid heavily calcified stenosis treated with Shockwave intravascular lithotripsy and a 3.0 X 48 Synergy FRANTZ.       LCX: 10-20% proximal stenosis.       RCA: 10-20% diffuse stenosis.         Access: Right radial artery       Hemostasis: Radial band       Total Contrast: 185 mL Omnipaque    PAST MEDICAL & SURGICAL HISTORY:  Asthma  No significant past surgical history    FAMILY HISTORY:  FH: heart disease (Father, Mother)    Home Medications: None    Patient is a 61y old  Male who presents with a chief complaint of chest pain (12 Oct 2021 18:27)    HPI: 62 y/o male with h/o asthma ? not on any meds,  angina ? as per pt. had cardiac cath in 2008 and it was negative but was started on metoprolol later he stopped using metoprolol on his own ( 4 years ago)  has been using asa 81 mg daily for past 2 weeks as he had the feeling something was not right came to [facility]ED for chest pain. Pt states that he has been experiencing on and off left sided throbbing chest pain radiating to left axilla with associated pulsating sensation in left side of neck over the past month. The first time patient experienced chest pain was while he was cutting down trees [facility], pain relieved with rest, usually lasting 20 minutes in duration. Pt denies SOB, palpitations, fevers, chills coughing, n/v/d  or abd. pain. Today, chest pain lasted "for hours". In ED, Tropx2-negative, Xray chest no acute findings, had ct chest cardiac and had very high calcium score. Cardiology team plans to do cardiac cath in am.  (12 Oct 2021 18:27)    General: No fatigue, no fevers/chills  Respiratory: No dyspnea, no cough, no wheeze  CV: No chest pain, no palpitations  Abd: No nausea  Neuro: No headache, no dizziness    No Known Allergies    Objective:  Vital Signs Last 24 Hrs  T(C): 36.4 (13 Oct 2021 06:08), Max: 36.6 (12 Oct 2021 16:37)  T(F): 97.6 (13 Oct 2021 06:08), Max: 97.8 (12 Oct 2021 16:37)  HR: 62 (13 Oct 2021 06:08) (55 - 64)  BP: 110/67 (13 Oct 2021 06:08) (110/67 - 146/83)  RR: 18 (13 Oct 2021 06:08) (16 - 20)  SpO2: 97% (13 Oct 2021 06:08) (97% - 98%)    CM: SR  Neuro: A&OX3, CN 2-12 intact  HEENT: NC, AT  Lungs: CTA B/L  CV: S1, S2, no murmur, RRR  Abd: Soft  Extremity: Right radial band: no bleeding, fingers warm with good cap refil    EKG: SB, 1st degree AV block                        14.7   5.26  )-----------( 160      ( 12 Oct 2021 10:13 )             42.3     10-12    136  |  101  |  15.5  ----------------------------<  118  4.0   |  24.0  |  0.63    Ca    9.2      12 Oct 2021 10:13    TPro  6.9  /  Alb  4.3  /  TBili  0.3  /  DBili  x   /  AST  21  /  ALT  31  /  AlkPhos  67  10-12    PT/INR - ( 12 Oct 2021 19:53 )   PT: 13.2 sec;   INR: 1.15 ratio    PTT - ( 12 Oct 2021 19:53 )  PTT:30.2 sec    Lipids       Total Cholesterol: 175       Triglycerides: 249       HDL: 44       Non-HDL: 131       LDL: 81    HgbA1C: 6.3%

## 2021-10-13 NOTE — PROGRESS NOTE ADULT - SUBJECTIVE AND OBJECTIVE BOX
MIKE COSTA    [id number]    61y      Male    CC: Chest pain on exertion admitted with uinstable angina   s/p LHC - 2 stents to LAD   Doing well, seen in Cath labs recovery  offers no complaints    INTERVAL HPI/OVERNIGHT EVENTS: as above    REVIEW OF SYSTEMS:    CONSTITUTIONAL: No fever, fatigue  RESPIRATORY: No cough, wheezing, No shortness of breath  CARDIOVASCULAR: No chest pain, palpitations  GASTROINTESTINAL: No abdominal or epigastric pain. No nausea, vomiting        Vital Signs Last 24 Hrs  T(C): 36.4 (13 Oct 2021 06:08), Max: 36.6 (12 Oct 2021 16:37)  T(F): 97.6 (13 Oct 2021 06:08), Max: 97.8 (12 Oct 2021 16:37)  HR: 63 (13 Oct 2021 10:45) (55 - 69)  BP: 113/67 (13 Oct 2021 10:45) (108/67 - 146/83)  BP(mean): --  RR: 17 (13 Oct 2021 10:45) (16 - 20)  SpO2: 95% (13 Oct 2021 10:45) (95% - 98%)    PHYSICAL EXAM:    GENERAL: NAD, well-groomed  HEENT: PERRL, +EOMI  NECK: soft, Supple, No JVD,   CHEST/LUNG: Clear to percussion bilaterally; No wheezing  HEART: S1S2+, Regular rate and rhythm; No murmurs  ABDOMEN: Soft, Nontender, Nondistended; Bowel sounds present  EXTREMITIES:No clubbing, cyanosis, or edema  SKIN: No rashes or lesions  NEURO: AAOX3        LABS:                        14.7   5.26  )-----------( 160      ( 12 Oct 2021 10:13 )             42.3     10-12    136  |  101  |  15.5  ----------------------------<  118<H>  4.0   |  24.0  |  0.63    Ca    9.2      12 Oct 2021 10:13    TPro  6.9  /  Alb  4.3  /  TBili  0.3<L>  /  DBili  x   /  AST  21  /  ALT  31  /  AlkPhos  67  10-12    PT/INR - ( 12 Oct 2021 19:53 )   PT: 13.2 sec;   INR: 1.15 ratio         PTT - ( 12 Oct 2021 19:53 )  PTT:30.2 sec        MEDICATIONS  (STANDING):  aspirin  chewable 81 milliGRAM(s) Oral daily  atorvastatin 80 milliGRAM(s) Oral at bedtime  clopidogrel Tablet 600 milliGRAM(s) Oral once  metoprolol tartrate 25 milliGRAM(s) Oral two times a day    MEDICATIONS  (PRN):  nitroglycerin     SubLingual 0.4 milliGRAM(s) SubLingual every 5 minutes PRN Chest Pain      RADIOLOGY & ADDITIONAL TESTS:

## 2021-10-13 NOTE — DISCHARGE NOTE NURSING/CASE MANAGEMENT/SOCIAL WORK - PATIENT PORTAL LINK FT
You can access the FollowMyHealth Patient Portal offered by [facility] by registering at the following website: [email or web address]. By joining [employer]’s FollowMyHealth portal, you will also be able to view your health information using other applications (apps) compatible with our system.

## 2021-10-13 NOTE — PROGRESS NOTE ADULT - ATTENDING COMMENTS
pt seen and examined.  s/p lhc with LAD disease, calcified and stented  no more cp  radial site is clean and dry  Monitor on tele  plan for dc in am

## 2021-10-13 NOTE — PROGRESS NOTE ADULT - ASSESSMENT
61y Male    Assessment and Plan:     1. CAD, S/P PCI with intravascular lithotripsy and FRANTZ of the proximal and mid LAD  ·	Remove radial band at: 12:00  ·	DAPT: The patient was loaded with Brilinta 180 mg in the CCL. Will load with Plavix 600 mg tonight at 6:00PM and start Plavix 75mg daily and aspirin 81mg daily tomorrow.  ·	Statin: Lipitor 80 mg daily  ·	Betya Blocker: Metoprolol 25 mg BID  ·	Aggressive lifestyle modification and risk factor reduction.  ·	Cardiac rehab info provided/referral and communication to cardiac rehab completed   ·	CBC, BMP, Mg, EKG in AM    2. HLD  ·	Lipitor 80 mg daily  ·	Lipids not at goal (Non-HDL < 100 and LDL < 70)    Discharge Planning:   ·	Probable discharge in AM  ·	Follow up as an outpatient with: Dr. Mcallister           61y Male    Assessment and Plan:     1. CAD, S/P PCI with intravascular lithotripsy and FRANTZ of the proximal and mid LAD  ·	Remove radial band at: 12:00  ·	DAPT: The patient was loaded with Brilinta 180 mg in the CCL. Will load with Plavix 600 mg tonight at 6:00PM and start Plavix 75mg daily and aspirin 81mg daily tomorrow.  ·	Statin: Lipitor 80 mg daily  ·	Beta Blocker: Metoprolol 25 mg BID  ·	Aggressive lifestyle modification and risk factor reduction.  ·	Cardiac rehab info provided/referral and communication to cardiac rehab completed   ·	CBC, BMP, Mg, EKG in AM    2. HLD  ·	Lipitor 80 mg daily  ·	Lipids not at goal (Non-HDL < 100 and LDL < 70)    3. Prediabetes  ·	HgbA1C: 6.3%  ·	Patient teaching started    Discharge Planning:   ·	Probable discharge in AM  ·	Follow up as an outpatient with: Dr. Mcallister

## 2021-10-14 ENCOUNTER — TRANSCRIPTION ENCOUNTER (OUTPATIENT)
Age: 61
End: 2021-10-14

## 2021-10-14 VITALS
DIASTOLIC BLOOD PRESSURE: 60 MMHG | SYSTOLIC BLOOD PRESSURE: 107 MMHG | RESPIRATION RATE: 16 BRPM | HEART RATE: 63 BPM | OXYGEN SATURATION: 96 %

## 2021-10-14 LAB
ANION GAP SERPL CALC-SCNC: 14 MMOL/L — SIGNIFICANT CHANGE UP (ref 5–17)
BUN SERPL-MCNC: 11.5 MG/DL — SIGNIFICANT CHANGE UP (ref 8–20)
CALCIUM SERPL-MCNC: 9.3 MG/DL — SIGNIFICANT CHANGE UP (ref 8.6–10.2)
CHLORIDE SERPL-SCNC: 101 MMOL/L — SIGNIFICANT CHANGE UP (ref 98–107)
CO2 SERPL-SCNC: 23 MMOL/L — SIGNIFICANT CHANGE UP (ref 22–29)
CREAT SERPL-MCNC: 0.67 MG/DL — SIGNIFICANT CHANGE UP (ref 0.5–1.3)
GLUCOSE SERPL-MCNC: 119 MG/DL — HIGH (ref 70–99)
HCT VFR BLD CALC: 44.6 % — SIGNIFICANT CHANGE UP (ref 39–50)
HGB BLD-MCNC: 14.9 G/DL — SIGNIFICANT CHANGE UP (ref 13–17)
MAGNESIUM SERPL-MCNC: 2.1 MG/DL — SIGNIFICANT CHANGE UP (ref 1.6–2.6)
MCHC RBC-ENTMCNC: 30.2 PG — SIGNIFICANT CHANGE UP (ref 27–34)
MCHC RBC-ENTMCNC: 33.4 GM/DL — SIGNIFICANT CHANGE UP (ref 32–36)
MCV RBC AUTO: 90.5 FL — SIGNIFICANT CHANGE UP (ref 80–100)
PLATELET # BLD AUTO: 167 K/UL — SIGNIFICANT CHANGE UP (ref 150–400)
POTASSIUM SERPL-MCNC: 4.3 MMOL/L — SIGNIFICANT CHANGE UP (ref 3.5–5.3)
POTASSIUM SERPL-SCNC: 4.3 MMOL/L — SIGNIFICANT CHANGE UP (ref 3.5–5.3)
RBC # BLD: 4.93 M/UL — SIGNIFICANT CHANGE UP (ref 4.2–5.8)
RBC # FLD: 12.8 % — SIGNIFICANT CHANGE UP (ref 10.3–14.5)
SODIUM SERPL-SCNC: 138 MMOL/L — SIGNIFICANT CHANGE UP (ref 135–145)
WBC # BLD: 6.92 K/UL — SIGNIFICANT CHANGE UP (ref 3.8–10.5)
WBC # FLD AUTO: 6.92 K/UL — SIGNIFICANT CHANGE UP (ref 3.8–10.5)

## 2021-10-14 PROCEDURE — C1725: CPT

## 2021-10-14 PROCEDURE — G1004: CPT

## 2021-10-14 PROCEDURE — 99239 HOSP IP/OBS DSCHRG MGMT >30: CPT

## 2021-10-14 PROCEDURE — 85379 FIBRIN DEGRADATION QUANT: CPT

## 2021-10-14 PROCEDURE — 86803 HEPATITIS C AB TEST: CPT

## 2021-10-14 PROCEDURE — C1894: CPT

## 2021-10-14 PROCEDURE — 86769 SARS-COV-2 COVID-19 ANTIBODY: CPT

## 2021-10-14 PROCEDURE — 80061 LIPID PANEL: CPT

## 2021-10-14 PROCEDURE — 99152 MOD SED SAME PHYS/QHP 5/>YRS: CPT

## 2021-10-14 PROCEDURE — 80048 BASIC METABOLIC PNL TOTAL CA: CPT

## 2021-10-14 PROCEDURE — 99153 MOD SED SAME PHYS/QHP EA: CPT

## 2021-10-14 PROCEDURE — 83880 ASSAY OF NATRIURETIC PEPTIDE: CPT

## 2021-10-14 PROCEDURE — 93005 ELECTROCARDIOGRAM TRACING: CPT

## 2021-10-14 PROCEDURE — 84484 ASSAY OF TROPONIN QUANT: CPT

## 2021-10-14 PROCEDURE — 96360 HYDRATION IV INFUSION INIT: CPT | Mod: XU

## 2021-10-14 PROCEDURE — 93458 L HRT ARTERY/VENTRICLE ANGIO: CPT | Mod: 59

## 2021-10-14 PROCEDURE — 83735 ASSAY OF MAGNESIUM: CPT

## 2021-10-14 PROCEDURE — 80053 COMPREHEN METABOLIC PANEL: CPT

## 2021-10-14 PROCEDURE — 71045 X-RAY EXAM CHEST 1 VIEW: CPT

## 2021-10-14 PROCEDURE — U0003: CPT

## 2021-10-14 PROCEDURE — 93010 ELECTROCARDIOGRAM REPORT: CPT

## 2021-10-14 PROCEDURE — 83690 ASSAY OF LIPASE: CPT

## 2021-10-14 PROCEDURE — C1887: CPT

## 2021-10-14 PROCEDURE — 83036 HEMOGLOBIN GLYCOSYLATED A1C: CPT

## 2021-10-14 PROCEDURE — 85730 THROMBOPLASTIN TIME PARTIAL: CPT

## 2021-10-14 PROCEDURE — 99285 EMERGENCY DEPT VISIT HI MDM: CPT | Mod: 25

## 2021-10-14 PROCEDURE — 85610 PROTHROMBIN TIME: CPT

## 2021-10-14 PROCEDURE — 85025 COMPLETE CBC W/AUTO DIFF WBC: CPT

## 2021-10-14 PROCEDURE — 36415 COLL VENOUS BLD VENIPUNCTURE: CPT

## 2021-10-14 PROCEDURE — C1874: CPT

## 2021-10-14 PROCEDURE — 82550 ASSAY OF CK (CPK): CPT

## 2021-10-14 PROCEDURE — 75571 CT HRT W/O DYE W/CA TEST: CPT | Mod: ME

## 2021-10-14 PROCEDURE — 85027 COMPLETE CBC AUTOMATED: CPT

## 2021-10-14 PROCEDURE — C1753: CPT

## 2021-10-14 PROCEDURE — C9600: CPT | Mod: LD

## 2021-10-14 PROCEDURE — U0005: CPT

## 2021-10-14 PROCEDURE — C1769: CPT

## 2021-10-14 PROCEDURE — 99232 SBSQ HOSP IP/OBS MODERATE 35: CPT

## 2021-10-14 PROCEDURE — 92978 ENDOLUMINL IVUS OCT C 1ST: CPT | Mod: LD

## 2021-10-14 PROCEDURE — C1761: CPT

## 2021-10-14 RX ORDER — ASPIRIN/CALCIUM CARB/MAGNESIUM 324 MG
1 TABLET ORAL
Qty: 0 | Refills: 0 | DISCHARGE
Start: 2021-10-14

## 2021-10-14 RX ORDER — CLOPIDOGREL BISULFATE 75 MG/1
1 TABLET, FILM COATED ORAL
Qty: 90 | Refills: 3
Start: 2021-10-14 | End: 2022-10-08

## 2021-10-14 RX ORDER — METOPROLOL TARTRATE 50 MG
1 TABLET ORAL
Qty: 90 | Refills: 0
Start: 2021-10-14 | End: 2022-01-11

## 2021-10-14 RX ORDER — ATORVASTATIN CALCIUM 80 MG/1
1 TABLET, FILM COATED ORAL
Qty: 90 | Refills: 3
Start: 2021-10-14 | End: 2022-10-08

## 2021-10-14 RX ADMIN — Medication 25 MILLIGRAM(S): at 06:03

## 2021-10-14 NOTE — DISCHARGE NOTE NURSING/CASE MANAGEMENT/SOCIAL WORK - PATIENT PORTAL LINK FT
You can access the FollowMyHealth Patient Portal offered by [facility] by registering at the following website: [email or web address]. By joining [employer]’s FollowMyHealth portal, you will also be able to view your health information using other applications (apps) compatible with our system.

## 2021-10-14 NOTE — DISCHARGE NOTE NURSING/CASE MANAGEMENT/SOCIAL WORK - NSDCPEFALRISK_GEN_ALL_CORE
For information on Fall & injury Prevention, visit [email or web address]
For information on Fall & injury Prevention, visit [email or web address]

## 2021-10-14 NOTE — DISCHARGE NOTE PROVIDER - NSDCPNSUBOBJ_GEN_ALL_CORE
Department of Cardiology                                                                  [facility]/[place] [address]                                                            Telephone: [phone]. Fax:[phone]                                                                                         PCI NOTE       Subjective:  61y  Male who had a left heart catheterization which showed (official report pending):       LM: No significant CAD.       LAD: 90% proximal heavily calcified stenosis treated with Shockwave intravascular lithotripsy and a 3.5 X 24 Synergy FRANTZ, and a 70% mid heavily calcified stenosis treated with Shockwave intravascular lithotripsy and a 3.0 X 48 Synergy FRANTZ.       LCX: 10-20% proximal stenosis.       RCA: 10-20% diffuse stenosis.         Access: Right radial artery       Hemostasis: Radial band       Total Contrast: 185 mL Omnipaque    PAST MEDICAL & SURGICAL HISTORY:  Asthma  No significant past surgical history    FAMILY HISTORY:  FH: heart disease (Father, Mother)    Home Medications: None    Patient is a 61y old  Male who presents with a chief complaint of chest pain (12 Oct 2021 18:27)    HPI: 60 y/o male with h/o asthma ? not on any meds,  angina ? as per pt. had cardiac cath in 2008 and it was negative but was started on metoprolol later he stopped using metoprolol on his own ( 4 years ago)  has been using asa 81 mg daily for past 2 weeks as he had the feeling something was not right came to [facility]-ED for chest pain. Pt states that he has been experiencing on and off left sided throbbing chest pain radiating to left axilla with associated pulsating sensation in left side of neck over the past month. 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 Pt denies SOB, palpitations, fevers, chills coughing, n/v/d  or abd. pain. Today, chest pain lasted "for hours". In ED, Tropx2-negative, Xray chest no acute findings, had ct chest cardiac and had very high calcium score. Cardiology team plans to do cardiac cath in am.  (12 Oct 2021 18:27)    Vital Signs Last 24 Hrs  T(C): --  T(F): --  HR: 63 (14 Oct 2021 07:30) (56 - 72)  BP: 107/60 (14 Oct 2021 07:30) (100/57 - 143/93)  BP(mean): 88 (13 Oct 2021 22:00) (88 - 88)  ABP: --  ABP(mean): --  RR: 16 (14 Oct 2021 07:30) (14 - 18)  SpO2: 96% (14 Oct 2021 07:30) (94% - 99%)    CM: SR  Neuro: A&OX3, CN 2-12 intact  HEENT: NC, AT  Lungs: CTA B/L  CV: S1, S2, no murmur, RRR  Abd: Soft  Extremity: Right radial band: no bleeding, fingers warm with good cap refil    61y Male Post LAD PCI     Assessment and Plan:     1. CAD, S/P PCI with intravascular lithotripsy and FRANTZ of the proximal and mid LAD  Remove radial band at: 12:00  DAPT: The patient was loaded with Brilinta 180 mg in the CCL. Will load with Plavix 600 mg tonight at 6:00PM and start Plavix 75mg daily and aspirin 81mg daily tomorrow.  Statin: Lipitor 80 mg daily  Beta Blocker: Metoprolol ER PO daily   Aggressive lifestyle modification and risk factor reduction.  Cardiac rehab info provided/referral and communication to cardiac rehab completed   CBC, BMP, Mg, EKG in AM    2. HLD  Lipitor 80 mg daily  Lipids not at goal (Non-HDL < 100 and LDL < 70)    3. Prediabetes  HgbA1C: 6.3%  Patient teaching started     Discharge Planning:   Probable discharge in AM  Follow up as an outpatient with: Dr. Mcallister

## 2021-10-14 NOTE — DISCHARGE NOTE PROVIDER - CARE PROVIDER_API CALL
Virgil Mcallister)  Cardiovascular Disease  [address], [place] [address]  Phone: [phone]  Fax: [phone]  Follow Up Time: 1 week  
HEADACHE

## 2021-10-14 NOTE — DISCHARGE NOTE PROVIDER - NSDCCPTREATMENT_GEN_ALL_CORE_FT
PRINCIPAL PROCEDURE  Procedure: Initial percutaneous transluminal insertion of drug eluting stent into left anterior descending heather  Findings and Treatment: Do not stop Aspirin or Plavix without speaking with cardiologist first

## 2021-10-14 NOTE — DISCHARGE NOTE PROVIDER - NSDCCPCAREPLAN_GEN_ALL_CORE_FT
PRINCIPAL DISCHARGE DIAGNOSIS  Diagnosis: Coronary artery disease involving native coronary artery of native heart with unstable angina pector  Assessment and Plan of Treatment: Do not stop Aspirin or Plavix without speaking with cardiologist first  Restricted use with no heavy lifting of affected arm for 48 hours.  No submerging the arm in water for 48 hours.  You may start showering today.  Call your doctor for any bleeding, swelling, loss of sensation in the hand or fingers, or fingers turning blue.  If heavy bleeding or large lumps form, hold pressure at the spot and come to the Emergency Room.  Most former smokers quit without using one of the treatments that scientific research has shown can work. However, the following treatments are proven to be effective for smokers who want help to quit:  •Brief help by a doctor (such as when a doctor takes 10 minutes or less to give a patient advice and assistance about quitting)  •Individual, group, or telephone counseling  •Behavioral therapies (such as training in problem solving)  •Treatments with more person-to-person contact and more intensity (such as more or longer counseling sessions)  •Programs to deliver treatments using mobile phones  Medications for quitting that have been found to be effective include the following:  •Nicotine replacement products       Over-the-counter (nicotine patch [which is also available by prescription], gum, lozenge)       Prescription (nicotine patch, inhaler, nasal spray)  •Prescription non-nicotine medications: bupropion SR (Zyban®), varenicline tartrate (Chantix®)        SECONDARY DISCHARGE DIAGNOSES  Diagnosis: Mixed hyperlipidemia  Assessment and Plan of Treatment:     Diagnosis: Prediabetes  Assessment and Plan of Treatment:

## 2021-10-14 NOTE — DISCHARGE NOTE PROVIDER - HOSPITAL COURSE
Subjective:  61y  Male who had a left heart catheterization which showed (official report pending):       LM: No significant CAD.       LAD: 90% proximal heavily calcified stenosis treated with Shockwave intravascular lithotripsy and a 3.5 X 24 Synergy FRANTZ, and a 70% mid heavily calcified stenosis treated with Shockwave intravascular lithotripsy and a 3.0 X 48 Synergy FRANTZ.       LCX: 10-20% proximal stenosis.       RCA: 10-20% diffuse stenosis.         Access: Right radial artery       Hemostasis: Radial band       Total Contrast: 185 mL Omnipaque    PAST MEDICAL & SURGICAL HISTORY:  Asthma  No significant past surgical history    FAMILY HISTORY:  FH: heart disease (Father, Mother)    Home Medications: None    Patient is a 61y old  Male who presents with a chief complaint of chest pain (12 Oct 2021 18:27)    HPI: 62 y/o male with h/o asthma ? not on any meds,  angina ? as per pt. had cardiac cath in 2008 and it was negative but was started on metoprolol later he stopped using metoprolol on his own ( 4 years ago)  has been using asa 81 mg daily for past 2 weeks as he had the feeling something was not right came to [facility] for chest pain. Pt states that he has been experiencing on and off left sided throbbing chest pain radiating to left axilla with associated pulsating sensation in left side of neck over the past month. The first time patient experienced chest [place] pain relieved with rest, usually lasting 20 minutes in duration. Pt denies SOB, palpitations, fevers, chills coughing, n/v/d  or abd. pain. Today, chest pain lasted "for hours". In ED, Tropx2-negative, Xray chest no acute findings, had ct chest cardiac and had very high calcium score. Cardiology team plans to do cardiac cath in am.  (12 Oct 2021 18:27)  Assessment and Plan:     1. CAD, S/P PCI with intravascular lithotripsy and FRANTZ of the proximal and mid LAD  Remove radial band at: 12:00  DAPT: The patient was loaded with Brilinta 180 mg in the CCL. Will load with Plavix 600 mg tonight at 6:00PM and start Plavix 75mg daily and aspirin 81mg daily tomorrow.  Statin: Lipitor 80 mg daily  Beta Blocker: Metoprolol ER 50 mg po daily   Aggressive lifestyle modification and risk factor reduction.  Cardiac rehab info provided/referral and communication to cardiac rehab completed   CBC, BMP, Mg, EKG in AM    2. HLD  Lipitor 80 mg daily  Lipids not at goal (Non-HDL < 100 and LDL < 70)    3. Prediabetes  HgbA1C: 6.3%  Patient teaching started    Discharge Planning:   Probable discharge in AM  Follow up as an outpatient with: Dr. Mcallister  Spoke to Dr Freeman - will come down for disposition home .              Patient is a 61y old  Male who presents with a chief complaint of chest pain (12 Oct 2021 18:27)    HPI: 62 y/o male with h/o asthma ? not on any meds,  angina ? as per pt. had cardiac cath in 2008 and it was negative but was started on metoprolol later he stopped using metoprolol on his own ( 4 years ago)  has been using asa 81 mg daily for past 2 weeks as he had the feeling something was not right came to [facility]ED for chest pain. Pt states that he has been experiencing on and off left sided throbbing chest pain radiating to left axilla with associated pulsating sensation in left side of neck over the past month. The first time patient experienced chest pain [place] pain relieved with rest, usually lasting 20 minutes in duration. Pt denies SOB, palpitations, fevers, chills coughing, n/v/d  or abd. pain. Today, chest pain lasted "for hours". In ED, Tropx2-negative, Xray chest no acute findings, had ct chest cardiac and had very high calcium score. Cardiology team plans to do cardiac cath in am.  (12 Oct 2021 18:27)  Assessment and Plan:     1. CAD, S/P PCI with intravascular lithotripsy and FRANTZ of the proximal and mid LAD  Remove radial band at: 12:00  DAPT: The patient was loaded with Brilinta 180 mg in the CCL. Will load with Plavix 600 mg tonight at 6:00PM and start Plavix 75mg daily and aspirin 81mg daily tomorrow.  Statin: Lipitor 80 mg daily  Beta Blocker: Metoprolol ER 50 mg po daily   Aggressive lifestyle modification and risk factor reduction.  Cardiac rehab info provided/referral and communication to cardiac rehab completed   CBC, BMP, Mg, EKG in AM    2. HLD  Lipitor 80 mg daily  Lipids not at goal (Non-HDL < 100 and LDL < 70)    3. Prediabetes  HgbA1C: 6.3%  Patient teaching started    4. tobacco use - smokes cigars social use. counselled to quit.         Subjective:  61y  Male who had a left heart catheterization which showed (official report pending):       LM: No significant CAD.       LAD: 90% proximal heavily calcified stenosis treated with Shockwave intravascular lithotripsy and a 3.5 X 24 Synergy FRANTZ, and a 70% mid heavily calcified stenosis treated with Shockwave intravascular lithotripsy and a 3.0 X 48 Synergy FRANTZ.       LCX: 10-20% proximal stenosis.       RCA: 10-20% diffuse stenosis.         Access: Right radial artery       Hemostasis: Radial band       Total Contrast: 185 mL Omnipaque      pt seen and examined at bedside   vitals reviewed   PHYSICAL EXAM:    GENERAL: NAD, well-groomed, well-developed  HEAD:  Atraumatic, Normocephalic  EYES: EOMI, PERRLA, conjunctiva and sclera clear  NECK: Supple, No JVD, Normal thyroid  NERVOUS SYSTEM:  Alert & Oriented X3, Good concentration;  CHEST/LUNG: Clear to percussion bilaterally; No rales, rhonchi,  HEART: Regular rate and rhythm; No murmurs  EXTREMITIES:  2No clubbing, cyanosis, or edema

## 2021-10-14 NOTE — DISCHARGE NOTE PROVIDER - NSDCMRMEDTOKEN_GEN_ALL_CORE_FT
aspirin 81 mg oral tablet, chewable: 1 tab(s) orally once a day  atorvastatin 80 mg oral tablet: 1 tab(s) orally once a day (at bedtime)  clopidogrel 75 mg oral tablet: 1 tab(s) orally once a day  Metoprolol Succinate ER 50 mg oral tablet, extended release: 1 milligram(s) orally once a day (at bedtime)  Take at Bedtime

## 2021-10-19 PROBLEM — J45.909 UNSPECIFIED ASTHMA, UNCOMPLICATED: Chronic | Status: ACTIVE | Noted: 2021-10-12

## 2021-10-20 ENCOUNTER — APPOINTMENT (OUTPATIENT)
Dept: CARDIOLOGY | Facility: CLINIC | Age: 61
End: 2021-10-20
Payer: COMMERCIAL

## 2021-10-20 ENCOUNTER — NON-APPOINTMENT (OUTPATIENT)
Age: 61
End: 2021-10-20

## 2021-10-20 VITALS
BODY MASS INDEX: 33.8 KG/M2 | WEIGHT: 223 LBS | TEMPERATURE: 97.9 F | OXYGEN SATURATION: 95 % | HEART RATE: 68 BPM | DIASTOLIC BLOOD PRESSURE: 70 MMHG | HEIGHT: 68 IN | SYSTOLIC BLOOD PRESSURE: 118 MMHG

## 2021-10-20 VITALS — DIASTOLIC BLOOD PRESSURE: 72 MMHG | SYSTOLIC BLOOD PRESSURE: 120 MMHG

## 2021-10-20 DIAGNOSIS — M54.9 DORSALGIA, UNSPECIFIED: ICD-10-CM

## 2021-10-20 DIAGNOSIS — G89.29 LOW BACK PAIN, UNSPECIFIED: ICD-10-CM

## 2021-10-20 DIAGNOSIS — M54.50 LOW BACK PAIN, UNSPECIFIED: ICD-10-CM

## 2021-10-20 PROCEDURE — 99214 OFFICE O/P EST MOD 30 MIN: CPT

## 2021-10-20 PROCEDURE — 93000 ELECTROCARDIOGRAM COMPLETE: CPT

## 2021-10-20 RX ORDER — RIVAROXABAN 10 MG/1
10 TABLET, FILM COATED ORAL
Qty: 30 | Refills: 0 | Status: DISCONTINUED | COMMUNITY
Start: 2021-02-12 | End: 2021-10-20

## 2021-10-20 RX ORDER — PREDNISONE 10 MG/1
10 TABLET ORAL
Qty: 42 | Refills: 0 | Status: DISCONTINUED | COMMUNITY
Start: 2021-02-10 | End: 2021-10-20

## 2021-10-24 NOTE — ASSESSMENT
[FreeTextEntry1] : Patient with recent PCI to LAD.  On aspirin and Plavix\par Prescribed diclofenac gel to be used sparingly with back pain.\par Does not have any tenderness on palpation of his back.\par Although aorta was approached from the radial side possibility of descending aortic dissection is a still there.  CAT scan of abdomen and pelvis is ordered for further evaluation\par .  Patient's blood pressures to goal\par Continue with a statin therapy.\par Echocardiogram post PCI\par Follow-up with me in about 3 months.

## 2021-10-24 NOTE — PHYSICAL EXAM
[Well Developed] : well developed [Well Nourished] : well nourished [Normal Venous Pressure] : normal venous pressure [Normal S1, S2] : normal S1, S2 [No Rub] : no rub [No Gallop] : no gallop [Clear Lung Fields] : clear lung fields [Soft] : abdomen soft [Non Tender] : non-tender [Normal Gait] : normal gait [Moves all extremities] : moves all extremities [No Focal Deficits] : no focal deficits [Alert and Oriented] : alert and oriented [de-identified] : Distal pulses normal

## 2021-10-24 NOTE — HISTORY OF PRESENT ILLNESS
[FreeTextEntry1] : Patient was seen at the hospital in October 12, 2021 with unstable angina.  Coronary calcium score was high therefore patient was referred for cardiac catheterization due to the symptoms.  The study was performed on October 13, 2021 showing normal left main, 90% diffuse disease in the proximal LAD and 80% in the mid LAD.  There was 20% proximal and mid RCA disease.  Shockwave IVL was used and 2 drug-eluting stents were placed in the proximal and mid LAD with good results.  Patient is on aspirin and Plavix.\par \par Denies having any chest pain or shortness of breath.  He does complain of right flank pain which occurred after cardiac catheterization.  Even though cardiac cath was performed from the right radial side.  He denies any signs or symptoms of UTI.  He denies any prior history of kidney stones\par EKG today with normal sinus rhythm heart rate is 62 bpm and nonspecific T wave abnormalities.

## 2021-11-03 ENCOUNTER — APPOINTMENT (OUTPATIENT)
Dept: CARDIOLOGY | Facility: CLINIC | Age: 61
End: 2021-11-03
Payer: COMMERCIAL

## 2021-11-03 PROCEDURE — 93306 TTE W/DOPPLER COMPLETE: CPT

## 2021-11-13 ENCOUNTER — APPOINTMENT (OUTPATIENT)
Dept: CT IMAGING | Facility: CLINIC | Age: 61
End: 2021-11-13

## 2021-11-13 ENCOUNTER — OUTPATIENT (OUTPATIENT)
Dept: OUTPATIENT SERVICES | Facility: HOSPITAL | Age: 61
LOS: 1 days | End: 2021-11-13
Payer: MEDICAID

## 2021-11-13 DIAGNOSIS — M54.9 DORSALGIA, UNSPECIFIED: ICD-10-CM

## 2021-11-13 PROCEDURE — 74176 CT ABD & PELVIS W/O CONTRAST: CPT | Mod: 26

## 2021-11-13 PROCEDURE — 74176 CT ABD & PELVIS W/O CONTRAST: CPT

## 2021-11-26 ENCOUNTER — NON-APPOINTMENT (OUTPATIENT)
Age: 61
End: 2021-11-26

## 2021-12-27 LAB
APPEARANCE: ABNORMAL
BILIRUBIN URINE: NEGATIVE
BLOOD URINE: ABNORMAL
COLOR: YELLOW
GLUCOSE QUALITATIVE U: NEGATIVE
KETONES URINE: NEGATIVE
LEUKOCYTE ESTERASE URINE: NEGATIVE
NITRITE URINE: NEGATIVE
PH URINE: 5.5
PROTEIN URINE: ABNORMAL
SPECIFIC GRAVITY URINE: 1.03
UROBILINOGEN URINE: NORMAL

## 2021-12-28 ENCOUNTER — NON-APPOINTMENT (OUTPATIENT)
Age: 61
End: 2021-12-28

## 2021-12-29 ENCOUNTER — RESULT CHARGE (OUTPATIENT)
Age: 61
End: 2021-12-29

## 2021-12-30 ENCOUNTER — NON-APPOINTMENT (OUTPATIENT)
Age: 61
End: 2021-12-30

## 2021-12-30 ENCOUNTER — APPOINTMENT (OUTPATIENT)
Dept: CARDIOLOGY | Facility: CLINIC | Age: 61
End: 2021-12-30
Payer: COMMERCIAL

## 2021-12-30 VITALS
WEIGHT: 228 LBS | SYSTOLIC BLOOD PRESSURE: 121 MMHG | TEMPERATURE: 98.5 F | HEIGHT: 68 IN | HEART RATE: 77 BPM | DIASTOLIC BLOOD PRESSURE: 74 MMHG | OXYGEN SATURATION: 98 % | BODY MASS INDEX: 34.56 KG/M2

## 2021-12-30 DIAGNOSIS — I20.8 OTHER FORMS OF ANGINA PECTORIS: ICD-10-CM

## 2021-12-30 PROCEDURE — 93000 ELECTROCARDIOGRAM COMPLETE: CPT

## 2021-12-30 PROCEDURE — 99214 OFFICE O/P EST MOD 30 MIN: CPT

## 2022-01-10 ENCOUNTER — APPOINTMENT (OUTPATIENT)
Dept: CARDIOLOGY | Facility: CLINIC | Age: 62
End: 2022-01-10

## 2022-01-20 ENCOUNTER — APPOINTMENT (OUTPATIENT)
Dept: CARDIOLOGY | Facility: CLINIC | Age: 62
End: 2022-01-20

## 2022-02-10 ENCOUNTER — APPOINTMENT (OUTPATIENT)
Dept: CARDIOLOGY | Facility: CLINIC | Age: 62
End: 2022-02-10
Payer: COMMERCIAL

## 2022-02-10 VITALS
DIASTOLIC BLOOD PRESSURE: 80 MMHG | WEIGHT: 230 LBS | HEART RATE: 68 BPM | SYSTOLIC BLOOD PRESSURE: 123 MMHG | RESPIRATION RATE: 15 BRPM | OXYGEN SATURATION: 96 % | HEIGHT: 68 IN | BODY MASS INDEX: 34.86 KG/M2

## 2022-02-10 DIAGNOSIS — N20.0 CALCULUS OF KIDNEY: ICD-10-CM

## 2022-02-10 PROCEDURE — 99214 OFFICE O/P EST MOD 30 MIN: CPT

## 2022-02-13 NOTE — PHYSICAL EXAM
[Well Developed] : well developed [Well Nourished] : well nourished [Normal Venous Pressure] : normal venous pressure [Normal S1, S2] : normal S1, S2 [No Rub] : no rub [No Gallop] : no gallop [Clear Lung Fields] : clear lung fields [Soft] : abdomen soft [Non Tender] : non-tender [Normal Gait] : normal gait [Moves all extremities] : moves all extremities [No Focal Deficits] : no focal deficits [Alert and Oriented] : alert and oriented [de-identified] : Distal pulses normal

## 2022-02-13 NOTE — HISTORY OF PRESENT ILLNESS
[FreeTextEntry1] : 61-year-old male is here for further evaluation due to coronary artery disease, abnormal CT scan finding.\par \par Patient presented on October 12, 2021 with unstable angina.  Left heart cardiac catheterization showed normal left main, 90% diffuse disease of proximal LAD and 80% mid LAD disease.  There was also 20% proximal and mid RCA disease.  He received 2 stents to LAD with good results.\par He is compliant with medications.  Due to weight loss CAT scan of abdomen and pelvis was performed.  I reviewed the images with him and the report of the study.\par Patient with sclerosing mesenteritis.  He states that his weight is now stable.  He has no chest pain or shortness of breath.\par \par Echocardiogram performed on 11/8/2021 shows normal LV size and function, mild LVH, hypokinesis of basal inferior wall and aortic root measuring 4 cm.

## 2022-02-13 NOTE — ASSESSMENT
[FreeTextEntry1] : sclerosing mesenteritis, etiology is unknown.  Patient is advised to see GI.  Referral for GI evaluation given to patient.\par History of nephrolithiasis and prostate calcification, advised to see urologist.\par Denies having any chest pain or shortness of breath with activity.\par Status post PCI to LAD x2, on dual antiplatelet therapy\par Blood pressure is to goal.\par Lifestyle modification discussed with patient\par .  On a statin therapy without any side effects.\par Follow-up with me in about 3 to 4 months as long as a stable and earlier if need be.\par Importance of following up closely with PMD as well as seen GI and urology was discussed with patient in length.  He demonstrated understanding of the importance and will do so.\par

## 2022-02-13 NOTE — REVIEW OF SYSTEMS
[Abdominal Pain] : abdominal pain [Change in Appetite] : no change in appetite [Negative] : Heme/Lymph

## 2022-04-07 NOTE — PROGRESS NOTE ADULT - ASSESSMENT
62 y/o male with h/o asthma ? not on any meds, hx cardiac cath in 2008-  negative, non compliant with medications admitted with chest pain. In ED, Tropx2-negative, Xray chest no acute findings, had ct chest cardiac and had very high calcium score. S/p [facility] 10/13 with 2  stents to LAD.    #CAD with unstable angina - s/p 2 FRANTZ to LAD  ct DAPT, lipitor, BB  cardio recs - plan to monitor today     # HPL - ct lipitor   # prediabetes - will need LSM  # TObacco use - smokes cigars socially  - counselled to quit  #  obesity - will benefit from weight reduction  recommendation dietary eval on outpatient    # VTE          18

## 2022-06-06 NOTE — DISCHARGE NOTE PROVIDER - PROVIDER RX CONTACT NUMBER
Reginald Amador  [facility] [address]  Scheduled Appointment: 06/17/2022    
[phone]

## 2022-09-06 ENCOUNTER — APPOINTMENT (OUTPATIENT)
Dept: CARDIOLOGY | Facility: CLINIC | Age: 62
End: 2022-09-06
Payer: COMMERCIAL

## 2022-09-06 ENCOUNTER — NON-APPOINTMENT (OUTPATIENT)
Age: 62
End: 2022-09-06

## 2022-09-06 VITALS
HEIGHT: 68 IN | SYSTOLIC BLOOD PRESSURE: 116 MMHG | WEIGHT: 230 LBS | HEART RATE: 72 BPM | OXYGEN SATURATION: 96 % | DIASTOLIC BLOOD PRESSURE: 68 MMHG | TEMPERATURE: 98.6 F | BODY MASS INDEX: 34.86 KG/M2

## 2022-09-06 DIAGNOSIS — I25.10 ATHEROSCLEROTIC HEART DISEASE OF NATIVE CORONARY ARTERY W/OUT ANGINA PECTORIS: ICD-10-CM

## 2022-09-06 DIAGNOSIS — K65.4 SCLEROSING MESENTERITIS: ICD-10-CM

## 2022-09-06 DIAGNOSIS — E78.2 MIXED HYPERLIPIDEMIA: ICD-10-CM

## 2022-09-06 PROCEDURE — 99214 OFFICE O/P EST MOD 30 MIN: CPT | Mod: 25

## 2022-09-06 PROCEDURE — 93000 ELECTROCARDIOGRAM COMPLETE: CPT

## 2022-09-06 RX ORDER — ALBUTEROL SULFATE 90 UG/1
108 (90 BASE) AEROSOL, METERED RESPIRATORY (INHALATION)
Qty: 1 | Refills: 5 | Status: DISCONTINUED | COMMUNITY
Start: 2021-02-10 | End: 2022-09-06

## 2022-09-06 RX ORDER — DICLOFENAC SODIUM 1% 10 MG/G
1 GEL TOPICAL
Qty: 1 | Refills: 1 | Status: DISCONTINUED | COMMUNITY
Start: 2021-10-20 | End: 2022-09-06

## 2022-09-06 RX ORDER — FLUTICASONE FUROATE AND VILANTEROL TRIFENATATE 200; 25 UG/1; UG/1
200-25 POWDER RESPIRATORY (INHALATION) DAILY
Qty: 1 | Refills: 5 | Status: DISCONTINUED | COMMUNITY
Start: 2021-02-10 | End: 2022-09-06

## 2022-09-06 RX ORDER — ASPIRIN ENTERIC COATED TABLETS 81 MG 81 MG/1
81 TABLET, DELAYED RELEASE ORAL DAILY
Qty: 30 | Refills: 0 | Status: DISCONTINUED | COMMUNITY
Start: 2021-02-12 | End: 2022-09-06

## 2022-09-06 NOTE — HISTORY OF PRESENT ILLNESS
[FreeTextEntry1] : 62-year-old male is here for further evaluation due to coronary artery disease, abnormal CT scan finding.\par \par Patient presented on October 12, 2021 with unstable angina.  Left heart cardiac catheterization showed normal left main, 90% diffuse disease of proximal LAD and 80% mid LAD disease.  There was also 20% proximal and mid RCA disease.  He received 2 stents to LAD with good results.\par Denies any abdominal pain. Walks frequently, does hiking as well. \par no cp or sob\par His weight has been stable. \par \par Echocardiogram performed on 11/8/2021 shows normal LV size and function, mild LVH, hypokinesis of basal inferior wall and aortic root measuring 4 cm.\par \par EKG: NSR, leftward axis, no st/t changes

## 2022-09-06 NOTE — ASSESSMENT
[FreeTextEntry1] : pt with CAD, pci to LAD\par Can now dc as\par Cont with plavix\par Lipid panel is ordered as well\par BP to goal\par Patient was advised to partake in 150 minutes of moderate exercise per week.\par Smokes cigars and advised by me to stop smoking\par BP to ogal\par Pt denies any abdominal pain, fu with gi. \par patient was advised to see us in 6 months if stable and certainly earlier with any new symptoms.\par

## 2022-09-06 NOTE — PHYSICAL EXAM
[Well Developed] : well developed [Well Nourished] : well nourished [Normal Venous Pressure] : normal venous pressure [Normal S1, S2] : normal S1, S2 [No Rub] : no rub [No Gallop] : no gallop [Clear Lung Fields] : clear lung fields [Soft] : abdomen soft [Non Tender] : non-tender [Normal Gait] : normal gait [Moves all extremities] : moves all extremities [No Focal Deficits] : no focal deficits [Alert and Oriented] : alert and oriented [de-identified] : Distal pulses normal

## 2022-09-06 NOTE — REVIEW OF SYSTEMS
[Abdominal Pain] : no abdominal pain [Change in Appetite] : no change in appetite [Negative] : Heme/Lymph

## 2022-09-28 DIAGNOSIS — E11.9 TYPE 2 DIABETES MELLITUS W/OUT COMPLICATIONS: ICD-10-CM

## 2022-09-28 LAB
ALBUMIN SERPL ELPH-MCNC: 4.6 G/DL
ALP BLD-CCNC: 84 U/L
ALT SERPL-CCNC: 28 U/L
ANION GAP SERPL CALC-SCNC: 12 MMOL/L
AST SERPL-CCNC: 20 U/L
BILIRUB SERPL-MCNC: 0.3 MG/DL
BUN SERPL-MCNC: 11 MG/DL
CALCIUM SERPL-MCNC: 9.3 MG/DL
CHLORIDE SERPL-SCNC: 105 MMOL/L
CHOLEST SERPL-MCNC: 122 MG/DL
CO2 SERPL-SCNC: 23 MMOL/L
CREAT SERPL-MCNC: 0.71 MG/DL
EGFR: 105 ML/MIN/1.73M2
GLUCOSE SERPL-MCNC: 141 MG/DL
HDLC SERPL-MCNC: 50 MG/DL
LDLC SERPL CALC-MCNC: 42 MG/DL
NONHDLC SERPL-MCNC: 72 MG/DL
POTASSIUM SERPL-SCNC: 4.3 MMOL/L
PROT SERPL-MCNC: 7 G/DL
SODIUM SERPL-SCNC: 140 MMOL/L
TRIGL SERPL-MCNC: 153 MG/DL

## 2022-09-28 RX ORDER — CLOPIDOGREL BISULFATE 75 MG/1
75 TABLET, FILM COATED ORAL
Qty: 90 | Refills: 3 | Status: ACTIVE | COMMUNITY
Start: 1900-01-01 | End: 1900-01-01

## 2022-09-28 RX ORDER — ATORVASTATIN CALCIUM 40 MG/1
40 TABLET, FILM COATED ORAL
Qty: 90 | Refills: 3 | Status: ACTIVE | COMMUNITY
Start: 1900-01-01 | End: 1900-01-01

## 2022-09-28 RX ORDER — METOPROLOL SUCCINATE 50 MG/1
50 TABLET, EXTENDED RELEASE ORAL DAILY
Qty: 90 | Refills: 1 | Status: ACTIVE | COMMUNITY
Start: 1900-01-01 | End: 1900-01-01

## 2022-09-28 RX ORDER — METFORMIN HYDROCHLORIDE 500 MG/1
500 TABLET, FILM COATED, EXTENDED RELEASE ORAL
Qty: 90 | Refills: 0 | Status: ACTIVE | COMMUNITY
Start: 2022-09-28 | End: 1900-01-01

## 2022-12-09 NOTE — DISCHARGE NOTE NURSING/CASE MANAGEMENT/SOCIAL WORK - NSDCPETBCESMAN_GEN_ALL_CORE
48
If you are a smoker, it is important for your health to stop smoking. Please be aware that second hand smoke is also harmful.
If you are a smoker, it is important for your health to stop smoking. Please be aware that second hand smoke is also harmful.

## 2025-07-03 ENCOUNTER — OFFICE (OUTPATIENT)
Dept: URBAN - METROPOLITAN AREA CLINIC 105 | Facility: CLINIC | Age: 65
Setting detail: OPHTHALMOLOGY
End: 2025-07-03
Payer: COMMERCIAL

## 2025-07-03 DIAGNOSIS — H52.4: ICD-10-CM

## 2025-07-03 DIAGNOSIS — H25.13: ICD-10-CM

## 2025-07-03 DIAGNOSIS — H01.002: ICD-10-CM

## 2025-07-03 DIAGNOSIS — H01.001: ICD-10-CM

## 2025-07-03 DIAGNOSIS — H01.004: ICD-10-CM

## 2025-07-03 DIAGNOSIS — H52.03: ICD-10-CM

## 2025-07-03 PROBLEM — H01.005 BLEPHARITIS; RIGHT UPPER LID, RIGHT LOWER LID, LEFT UPPER LID, LEFT LOWER LID: Status: ACTIVE | Noted: 2025-07-03

## 2025-07-03 PROBLEM — E11.9 DIABETES TYPE 2 NO RETINOPATHY: Status: ACTIVE | Noted: 2025-07-03

## 2025-07-03 PROCEDURE — 99203 OFFICE O/P NEW LOW 30 MIN: CPT | Performed by: OPTOMETRIST

## 2025-07-03 PROCEDURE — 92015 DETERMINE REFRACTIVE STATE: CPT | Performed by: OPTOMETRIST

## 2025-07-03 ASSESSMENT — REFRACTION_CURRENTRX
OS_SPHERE: +2.75
OS_VPRISM_DIRECTION: TRF
OD_ADD: +1.25
OD_OVR_VA: 20/
OD_CYLINDER: -0.25
OD_SPHERE: +1.75
OS_CYLINDER: -1.00
OS_ADD2: +2.50
OD_AXIS: 103
OS_AXIS: 080
OS_OVR_VA: 20/
OD_ADD2: +2.50
OS_ADD: +1.25
OD_VPRISM_DIRECTION: TRF

## 2025-07-03 ASSESSMENT — REFRACTION_AUTOREFRACTION
OD_SPHERE: +3.00
OD_CYLINDER: -1.00
OS_AXIS: 089
OS_SPHERE: +6.50
OD_AXIS: 093
OS_CYLINDER: -1.75

## 2025-07-03 ASSESSMENT — REFRACTION_MANIFEST
OS_CYLINDER: -1.25
OD_AXIS: 090
OD_CYLINDER: -0.75
OD_VA1: 20/20
OS_SPHERE: +3.75
OS_ADD: +2.50
OD_SPHERE: +2.75
OD_ADD: +2.50
OS_AXIS: 082
OS_VA1: 20/30

## 2025-07-03 ASSESSMENT — TONOMETRY
OS_IOP_MMHG: 18
OD_IOP_MMHG: 15

## 2025-07-03 ASSESSMENT — LID EXAM ASSESSMENTS
OD_BLEPHARITIS: RLL RUL T
OS_BLEPHARITIS: LLL LUL T

## 2025-07-03 ASSESSMENT — KERATOMETRY
OD_K2POWER_DIOPTERS: 44.00
OS_K2POWER_DIOPTERS: 44.25
OS_AXISANGLE_DEGREES: 003
OD_K1POWER_DIOPTERS: 42.75
OS_K1POWER_DIOPTERS: 42.75
OD_AXISANGLE_DEGREES: 004

## 2025-07-03 ASSESSMENT — VISUAL ACUITY
OS_BCVA: 20/20
OD_BCVA: 20/40

## 2025-07-03 ASSESSMENT — CONFRONTATIONAL VISUAL FIELD TEST (CVF)
OD_FINDINGS: FULL
OS_FINDINGS: FULL

## 2025-07-30 ENCOUNTER — OFFICE (OUTPATIENT)
Dept: URBAN - METROPOLITAN AREA CLINIC 105 | Facility: CLINIC | Age: 65
Setting detail: OPHTHALMOLOGY
End: 2025-07-30
Payer: COMMERCIAL

## 2025-07-30 DIAGNOSIS — H90.3: ICD-10-CM

## 2025-07-30 PROCEDURE — 92557 COMPREHENSIVE HEARING TEST: CPT | Performed by: AUDIOLOGIST-HEARING AID FITTER

## 2025-07-30 PROCEDURE — 92567 TYMPANOMETRY: CPT | Performed by: AUDIOLOGIST-HEARING AID FITTER
